# Patient Record
Sex: FEMALE | Race: WHITE | NOT HISPANIC OR LATINO | URBAN - METROPOLITAN AREA
[De-identification: names, ages, dates, MRNs, and addresses within clinical notes are randomized per-mention and may not be internally consistent; named-entity substitution may affect disease eponyms.]

---

## 2024-01-11 ENCOUNTER — LAB REQUISITION (OUTPATIENT)
Dept: LAB | Facility: HOSPITAL | Age: 24
End: 2024-01-11
Payer: COMMERCIAL

## 2024-01-11 DIAGNOSIS — Z34.81 ENCOUNTER FOR SUPERVISION OF OTHER NORMAL PREGNANCY, FIRST TRIMESTER (HHS-HCC): ICD-10-CM

## 2024-01-11 PROCEDURE — 87800 DETECT AGNT MULT DNA DIREC: CPT

## 2024-01-12 ENCOUNTER — LAB (OUTPATIENT)
Dept: LAB | Facility: LAB | Age: 24
End: 2024-01-12
Payer: COMMERCIAL

## 2024-01-12 DIAGNOSIS — Z34.81 ENCOUNTER FOR SUPERVISION OF OTHER NORMAL PREGNANCY, FIRST TRIMESTER (HHS-HCC): Primary | ICD-10-CM

## 2024-01-12 DIAGNOSIS — Z34.81 ENCOUNTER FOR SUPERVISION OF OTHER NORMAL PREGNANCY, FIRST TRIMESTER (HHS-HCC): ICD-10-CM

## 2024-01-12 LAB
ABO GROUP (TYPE) IN BLOOD: NORMAL
ANTIBODY SCREEN: NORMAL
C TRACH RRNA SPEC QL NAA+PROBE: NEGATIVE
ERYTHROCYTE [DISTWIDTH] IN BLOOD BY AUTOMATED COUNT: 12.8 % (ref 11.5–14.5)
EST. AVERAGE GLUCOSE BLD GHB EST-MCNC: 105 MG/DL
HBA1C MFR BLD: 5.3 %
HCT VFR BLD AUTO: 39.2 % (ref 36–46)
HGB BLD-MCNC: 12.5 G/DL (ref 12–16)
HIV 1+2 AB+HIV1 P24 AG SERPL QL IA: NONREACTIVE
MCH RBC QN AUTO: 27.6 PG (ref 26–34)
MCHC RBC AUTO-ENTMCNC: 31.9 G/DL (ref 32–36)
MCV RBC AUTO: 87 FL (ref 80–100)
N GONORRHOEA DNA SPEC QL PROBE+SIG AMP: NEGATIVE
NRBC BLD-RTO: 0 /100 WBCS (ref 0–0)
PLATELET # BLD AUTO: 270 X10*3/UL (ref 150–450)
RBC # BLD AUTO: 4.53 X10*6/UL (ref 4–5.2)
RH FACTOR (ANTIGEN D): NORMAL
WBC # BLD AUTO: 10 X10*3/UL (ref 4.4–11.3)

## 2024-01-12 PROCEDURE — 86317 IMMUNOASSAY INFECTIOUS AGENT: CPT

## 2024-01-12 PROCEDURE — 85027 COMPLETE CBC AUTOMATED: CPT

## 2024-01-12 PROCEDURE — 86780 TREPONEMA PALLIDUM: CPT

## 2024-01-12 PROCEDURE — 36415 COLL VENOUS BLD VENIPUNCTURE: CPT

## 2024-01-12 PROCEDURE — 86803 HEPATITIS C AB TEST: CPT

## 2024-01-12 PROCEDURE — 87340 HEPATITIS B SURFACE AG IA: CPT

## 2024-01-12 PROCEDURE — 87086 URINE CULTURE/COLONY COUNT: CPT

## 2024-01-12 PROCEDURE — 86901 BLOOD TYPING SEROLOGIC RH(D): CPT

## 2024-01-12 PROCEDURE — 86850 RBC ANTIBODY SCREEN: CPT

## 2024-01-12 PROCEDURE — 87389 HIV-1 AG W/HIV-1&-2 AB AG IA: CPT

## 2024-01-12 PROCEDURE — 86900 BLOOD TYPING SEROLOGIC ABO: CPT

## 2024-01-12 PROCEDURE — 83036 HEMOGLOBIN GLYCOSYLATED A1C: CPT

## 2024-01-13 LAB
BACTERIA UR CULT: NORMAL
HBV SURFACE AG SERPL QL IA: NONREACTIVE
HCV AB SER QL: NONREACTIVE
RUBV IGG SERPL IA-ACNC: 3.9 IA
RUBV IGG SERPL QL IA: POSITIVE
T PALLIDUM AB SER QL: NONREACTIVE

## 2024-02-12 ENCOUNTER — LAB (OUTPATIENT)
Dept: LAB | Facility: LAB | Age: 24
End: 2024-02-12
Payer: COMMERCIAL

## 2024-02-12 DIAGNOSIS — Z13.79 ENCOUNTER FOR OTHER SCREENING FOR GENETIC AND CHROMOSOMAL ANOMALIES: Primary | ICD-10-CM

## 2024-02-12 DIAGNOSIS — Z34.81 ENCOUNTER FOR SUPERVISION OF OTHER NORMAL PREGNANCY, FIRST TRIMESTER (HHS-HCC): ICD-10-CM

## 2024-03-14 ENCOUNTER — LAB REQUISITION (OUTPATIENT)
Dept: LAB | Facility: HOSPITAL | Age: 24
End: 2024-03-14
Payer: COMMERCIAL

## 2024-03-14 DIAGNOSIS — F12.90 CANNABIS USE, UNSPECIFIED, UNCOMPLICATED: ICD-10-CM

## 2024-03-14 DIAGNOSIS — Z02.83 ENCOUNTER FOR BLOOD-ALCOHOL AND BLOOD-DRUG TEST: ICD-10-CM

## 2024-03-14 DIAGNOSIS — Z34.82 ENCOUNTER FOR SUPERVISION OF OTHER NORMAL PREGNANCY, SECOND TRIMESTER (HHS-HCC): ICD-10-CM

## 2024-03-14 LAB
AMPHETAMINES UR QL SCN>1000 NG/ML: NEGATIVE
BARBITURATES UR QL SCN>300 NG/ML: NEGATIVE
BENZODIAZ UR QL SCN>300 NG/ML: NEGATIVE
BZE UR QL SCN>300 NG/ML: NEGATIVE
CANNABINOIDS UR QL SCN>50 NG/ML: POSITIVE
FENTANYL+NORFENTANYL UR QL SCN: NEGATIVE
METHADONE UR QL SCN>300 NG/ML: NEGATIVE
OPIATES UR QL SCN>300 NG/ML: NEGATIVE
OXYCODONE UR QL: NEGATIVE
PCP UR QL SCN>25 NG/ML: NEGATIVE

## 2024-03-14 PROCEDURE — 80307 DRUG TEST PRSMV CHEM ANLYZR: CPT

## 2024-03-20 LAB — SCAN RESULT: NORMAL

## 2024-04-11 ENCOUNTER — LAB REQUISITION (OUTPATIENT)
Dept: LAB | Facility: HOSPITAL | Age: 24
End: 2024-04-11
Payer: COMMERCIAL

## 2024-04-11 PROCEDURE — 87086 URINE CULTURE/COLONY COUNT: CPT

## 2024-04-13 LAB — BACTERIA UR CULT: NORMAL

## 2024-05-09 ENCOUNTER — HOSPITAL ENCOUNTER (OUTPATIENT)
Facility: HOSPITAL | Age: 24
Discharge: HOME | End: 2024-05-09
Attending: OBSTETRICS & GYNECOLOGY | Admitting: OBSTETRICS & GYNECOLOGY
Payer: COMMERCIAL

## 2024-05-09 ENCOUNTER — HOSPITAL ENCOUNTER (OUTPATIENT)
Facility: HOSPITAL | Age: 24
End: 2024-05-09
Attending: OBSTETRICS & GYNECOLOGY | Admitting: OBSTETRICS & GYNECOLOGY
Payer: COMMERCIAL

## 2024-05-09 VITALS
HEIGHT: 65 IN | RESPIRATION RATE: 18 BRPM | BODY MASS INDEX: 36.99 KG/M2 | TEMPERATURE: 98.2 F | OXYGEN SATURATION: 97 % | SYSTOLIC BLOOD PRESSURE: 118 MMHG | WEIGHT: 222 LBS | DIASTOLIC BLOOD PRESSURE: 77 MMHG | HEART RATE: 87 BPM

## 2024-05-09 PROCEDURE — 76819 FETAL BIOPHYS PROFIL W/O NST: CPT | Performed by: OBSTETRICS & GYNECOLOGY

## 2024-05-09 PROCEDURE — 99213 OFFICE O/P EST LOW 20 MIN: CPT | Performed by: OBSTETRICS & GYNECOLOGY

## 2024-05-09 PROCEDURE — 99213 OFFICE O/P EST LOW 20 MIN: CPT

## 2024-05-09 RX ORDER — SERTRALINE HYDROCHLORIDE 50 MG/1
50 TABLET, FILM COATED ORAL DAILY
COMMUNITY

## 2024-05-09 ASSESSMENT — PAIN SCALES - GENERAL
PAINLEVEL: 3
PAINLEVEL_OUTOF10: 3

## 2024-05-09 NOTE — DISCHARGE INSTRUCTIONS
Triage discharge instructions:          -assess fetal movements daily        -return to normal activity as tolerated         -may return to work/school when cleared by OB provider    []Notify provider for red or swollen leg that is painful or warm to touch  []Notify provider for fever or chills  []Notify provider for headache that does not get better, even after taking medicine  []Notify provider for blurry vision or spots before your eyes  []Notify provider for signs of depression  Examples include: Persistent sadness, frequent crying, sleep problems, excessive worrying, feeling unable to cope.  []Notify provider for pain, burning or difficulty with emptying your bladder  []Trust your instincts. Always get medical care if you are not feeling well  Or if you have questions or concerns.    []Notify provider for contractions or cramps become more frequent than 3-4 times in an hour  []Notify provider for regular painful contractions every 5 minutes or less for one hour  []Notify provider for leaking of fluid or blood from your vagina  []Notify provider if your baby is not moving as much as usual

## 2024-05-09 NOTE — H&P
Obstetrical Admission History and Physical     25 yo  with EDC=2024.  Patient c/o chest tightness, pinkl spotting when wiping and left sided rib pain.  She was told by her OB to go to ED to evaluate the chest pain.  She denies any heavy bleeding and the spotting was only when wiping, nothing on a pad.     Obstetrical History   OB History    Para Term  AB Living   4         0   SAB IAB Ectopic Multiple Live Births                  # Outcome Date GA Lbr Sourav/2nd Weight Sex Delivery Anes PTL Lv   4 Current            3             2             1                 Past Medical History  Past Medical History:   Diagnosis Date    Other conditions influencing health status     No significant past surgical history    Other conditions influencing health status     No significant past medical history        Past Surgical History   No past surgical history on file.    Social History  Social History     Tobacco Use    Smoking status: Not on file    Smokeless tobacco: Not on file   Substance Use Topics    Alcohol use: Not on file     Substance and Sexual Activity   Drug Use Not on file       Allergies  Patient has no known allergies.     Medications  Medications Prior to Admission   Medication Sig Dispense Refill Last Dose    doxylamine (Unisom, doxylamine,) 25 mg tablet Take 3 tablets (75 mg) by mouth as needed at bedtime for sleep.       sertraline (Zoloft) 50 mg tablet Take 1 tablet (50 mg) by mouth once daily.          Objective    Last Vitals  Temp Pulse Resp BP MAP O2 Sat   36.8 °C (98.2 °F) 87 18 118/77   97 %     Physical Examination  VSS  Lying in bed comfortably  Abd-gravid, NT  Cervix-closed on exam,no blood visualized with speculum exam  BPP: tone 2; movement 2; AFV 2; breathing 2       Normal FHT    Lab Review  Labs in chart were reviewed.      Keep appointment with her OB as scheduled.  To ED to evaluate chest pain

## 2024-07-06 ENCOUNTER — LAB (OUTPATIENT)
Dept: LAB | Facility: LAB | Age: 24
End: 2024-07-06
Payer: COMMERCIAL

## 2024-07-06 DIAGNOSIS — Z34.82 ENCOUNTER FOR SUPERVISION OF OTHER NORMAL PREGNANCY, SECOND TRIMESTER (HHS-HCC): Primary | ICD-10-CM

## 2024-07-06 LAB
ERYTHROCYTE [DISTWIDTH] IN BLOOD BY AUTOMATED COUNT: 14.9 % (ref 11.5–14.5)
GLUCOSE 1H P GLC SERPL-MCNC: 170 MG/DL (ref 65–139)
HCT VFR BLD AUTO: 35.7 % (ref 36–46)
HGB BLD-MCNC: 11.1 G/DL (ref 12–16)
MCH RBC QN AUTO: 28.1 PG (ref 26–34)
MCHC RBC AUTO-ENTMCNC: 31.1 G/DL (ref 32–36)
MCV RBC AUTO: 90 FL (ref 80–100)
NRBC BLD-RTO: 0 /100 WBCS (ref 0–0)
PLATELET # BLD AUTO: 194 X10*3/UL (ref 150–450)
RBC # BLD AUTO: 3.95 X10*6/UL (ref 4–5.2)
WBC # BLD AUTO: 11.5 X10*3/UL (ref 4.4–11.3)

## 2024-07-06 PROCEDURE — 36415 COLL VENOUS BLD VENIPUNCTURE: CPT

## 2024-07-06 PROCEDURE — 82947 ASSAY GLUCOSE BLOOD QUANT: CPT

## 2024-07-06 PROCEDURE — 85027 COMPLETE CBC AUTOMATED: CPT

## 2024-07-19 ENCOUNTER — LAB (OUTPATIENT)
Dept: LAB | Facility: LAB | Age: 24
End: 2024-07-19
Payer: COMMERCIAL

## 2024-07-19 DIAGNOSIS — Z13.79 ENCOUNTER FOR OTHER SCREENING FOR GENETIC AND CHROMOSOMAL ANOMALIES: ICD-10-CM

## 2024-07-19 DIAGNOSIS — L73.2 HIDRADENITIS SUPPURATIVA: Primary | ICD-10-CM

## 2024-07-19 LAB
GLUCOSE 1H P GLC SERPL-MCNC: 134 MG/DL (ref 65–139)
GLUCOSE 2H P GLC SERPL-MCNC: 122 MG/DL (ref 65–139)
GLUCOSE 3H P GLC SERPL-MCNC: 106 MG/DL (ref 65–139)
GLUCOSE P FAST SERPL-MCNC: 84 MG/DL (ref 69–99)

## 2024-07-19 PROCEDURE — 82950 GLUCOSE TEST: CPT

## 2024-07-19 PROCEDURE — 36415 COLL VENOUS BLD VENIPUNCTURE: CPT

## 2024-07-19 PROCEDURE — 82952 GTT-ADDED SAMPLES: CPT

## 2024-07-19 PROCEDURE — 82951 GLUCOSE TOLERANCE TEST (GTT): CPT

## 2024-07-19 PROCEDURE — 82947 ASSAY GLUCOSE BLOOD QUANT: CPT

## 2024-07-30 LAB — SCAN RESULT: NORMAL

## 2024-08-05 ENCOUNTER — HOSPITAL ENCOUNTER (OUTPATIENT)
Facility: HOSPITAL | Age: 24
Discharge: HOME | End: 2024-08-06
Attending: STUDENT IN AN ORGANIZED HEALTH CARE EDUCATION/TRAINING PROGRAM | Admitting: STUDENT IN AN ORGANIZED HEALTH CARE EDUCATION/TRAINING PROGRAM
Payer: COMMERCIAL

## 2024-08-05 VITALS
OXYGEN SATURATION: 97 % | TEMPERATURE: 98.4 F | WEIGHT: 290 LBS | BODY MASS INDEX: 54.75 KG/M2 | DIASTOLIC BLOOD PRESSURE: 61 MMHG | SYSTOLIC BLOOD PRESSURE: 117 MMHG | RESPIRATION RATE: 17 BRPM | HEIGHT: 61 IN | HEART RATE: 93 BPM

## 2024-08-05 ASSESSMENT — PAIN SCALES - GENERAL
PAINLEVEL_OUTOF10: 0 - NO PAIN
PAINLEVEL_OUTOF10: 0 - NO PAIN

## 2024-08-06 PROCEDURE — 99213 OFFICE O/P EST LOW 20 MIN: CPT

## 2024-08-06 RX ORDER — ONDANSETRON HYDROCHLORIDE 2 MG/ML
4 INJECTION, SOLUTION INTRAVENOUS EVERY 6 HOURS PRN
Status: DISCONTINUED | OUTPATIENT
Start: 2024-08-06 | End: 2024-08-06 | Stop reason: HOSPADM

## 2024-08-06 RX ORDER — NIFEDIPINE 10 MG/1
10 CAPSULE ORAL ONCE AS NEEDED
Status: DISCONTINUED | OUTPATIENT
Start: 2024-08-06 | End: 2024-08-06 | Stop reason: HOSPADM

## 2024-08-06 RX ORDER — ONDANSETRON 4 MG/1
4 TABLET, FILM COATED ORAL EVERY 6 HOURS PRN
Status: DISCONTINUED | OUTPATIENT
Start: 2024-08-06 | End: 2024-08-06 | Stop reason: HOSPADM

## 2024-08-06 RX ORDER — HYDRALAZINE HYDROCHLORIDE 20 MG/ML
5 INJECTION INTRAMUSCULAR; INTRAVENOUS ONCE AS NEEDED
Status: DISCONTINUED | OUTPATIENT
Start: 2024-08-06 | End: 2024-08-06 | Stop reason: HOSPADM

## 2024-08-06 RX ORDER — LIDOCAINE HYDROCHLORIDE 10 MG/ML
0.5 INJECTION, SOLUTION EPIDURAL; INFILTRATION; INTRACAUDAL; PERINEURAL ONCE AS NEEDED
Status: DISCONTINUED | OUTPATIENT
Start: 2024-08-06 | End: 2024-08-06 | Stop reason: HOSPADM

## 2024-08-06 RX ORDER — LABETALOL HYDROCHLORIDE 5 MG/ML
20 INJECTION, SOLUTION INTRAVENOUS ONCE AS NEEDED
Status: DISCONTINUED | OUTPATIENT
Start: 2024-08-06 | End: 2024-08-06 | Stop reason: HOSPADM

## 2024-08-06 ASSESSMENT — PAIN SCALES - GENERAL
PAINLEVEL_OUTOF10: 0 - NO PAIN
PAINLEVEL_OUTOF10: 0 - NO PAIN

## 2024-08-14 ENCOUNTER — LAB REQUISITION (OUTPATIENT)
Dept: LAB | Facility: HOSPITAL | Age: 24
End: 2024-08-14
Payer: COMMERCIAL

## 2024-08-14 DIAGNOSIS — Z34.83 ENCOUNTER FOR SUPERVISION OF OTHER NORMAL PREGNANCY, THIRD TRIMESTER (HHS-HCC): ICD-10-CM

## 2024-08-14 PROCEDURE — 87081 CULTURE SCREEN ONLY: CPT

## 2024-08-14 PROCEDURE — 87077 CULTURE AEROBIC IDENTIFY: CPT

## 2024-08-18 LAB — GP B STREP GENITAL QL CULT: NORMAL

## 2024-08-20 ENCOUNTER — ANESTHESIA EVENT (OUTPATIENT)
Dept: OBSTETRICS AND GYNECOLOGY | Facility: HOSPITAL | Age: 24
End: 2024-08-20
Payer: COMMERCIAL

## 2024-08-20 ENCOUNTER — ANESTHESIA (OUTPATIENT)
Dept: OBSTETRICS AND GYNECOLOGY | Facility: HOSPITAL | Age: 24
End: 2024-08-20
Payer: COMMERCIAL

## 2024-08-20 ENCOUNTER — APPOINTMENT (OUTPATIENT)
Dept: OBSTETRICS AND GYNECOLOGY | Facility: HOSPITAL | Age: 24
End: 2024-08-20
Payer: COMMERCIAL

## 2024-08-20 ENCOUNTER — HOSPITAL ENCOUNTER (INPATIENT)
Facility: HOSPITAL | Age: 24
LOS: 3 days | Discharge: HOME | End: 2024-08-23
Attending: STUDENT IN AN ORGANIZED HEALTH CARE EDUCATION/TRAINING PROGRAM | Admitting: SPECIALIST
Payer: COMMERCIAL

## 2024-08-20 DIAGNOSIS — Z34.90 TERM PREGNANCY (HHS-HCC): ICD-10-CM

## 2024-08-20 PROBLEM — R06.09 DYSPNEA ON EXERTION: Status: ACTIVE | Noted: 2024-08-20

## 2024-08-20 PROBLEM — K76.0 FATTY LIVER: Status: ACTIVE | Noted: 2024-08-20

## 2024-08-20 PROBLEM — M06.9 RHEUMATOID ARTHRITIS (MULTI): Status: ACTIVE | Noted: 2024-08-20

## 2024-08-20 LAB
ABO GROUP (TYPE) IN BLOOD: NORMAL
ANTIBODY SCREEN: NORMAL
ERYTHROCYTE [DISTWIDTH] IN BLOOD BY AUTOMATED COUNT: 15.2 % (ref 11.5–14.5)
HCT VFR BLD AUTO: 36.3 % (ref 36–46)
HGB BLD-MCNC: 11.7 G/DL (ref 12–16)
MCH RBC QN AUTO: 27.7 PG (ref 26–34)
MCHC RBC AUTO-ENTMCNC: 32.2 G/DL (ref 32–36)
MCV RBC AUTO: 86 FL (ref 80–100)
NRBC BLD-RTO: 0 /100 WBCS (ref 0–0)
PLATELET # BLD AUTO: 208 X10*3/UL (ref 150–450)
RBC # BLD AUTO: 4.22 X10*6/UL (ref 4–5.2)
RH FACTOR (ANTIGEN D): NORMAL
TREPONEMA PALLIDUM IGG+IGM AB [PRESENCE] IN SERUM OR PLASMA BY IMMUNOASSAY: NONREACTIVE
WBC # BLD AUTO: 11.1 X10*3/UL (ref 4.4–11.3)

## 2024-08-20 PROCEDURE — 3E0P7VZ INTRODUCTION OF HORMONE INTO FEMALE REPRODUCTIVE, VIA NATURAL OR ARTIFICIAL OPENING: ICD-10-PCS | Performed by: SPECIALIST

## 2024-08-20 PROCEDURE — 2500000001 HC RX 250 WO HCPCS SELF ADMINISTERED DRUGS (ALT 637 FOR MEDICARE OP)

## 2024-08-20 PROCEDURE — 2720000007 HC OR 272 NO HCPCS

## 2024-08-20 PROCEDURE — 86780 TREPONEMA PALLIDUM: CPT

## 2024-08-20 PROCEDURE — 86901 BLOOD TYPING SEROLOGIC RH(D): CPT

## 2024-08-20 PROCEDURE — 99223 1ST HOSP IP/OBS HIGH 75: CPT

## 2024-08-20 PROCEDURE — 2500000002 HC RX 250 W HCPCS SELF ADMINISTERED DRUGS (ALT 637 FOR MEDICARE OP, ALT 636 FOR OP/ED)

## 2024-08-20 PROCEDURE — 59050 FETAL MONITOR W/REPORT: CPT

## 2024-08-20 PROCEDURE — 2500000005 HC RX 250 GENERAL PHARMACY W/O HCPCS

## 2024-08-20 PROCEDURE — 7210000002 HC LABOR PER HOUR

## 2024-08-20 PROCEDURE — 85027 COMPLETE CBC AUTOMATED: CPT

## 2024-08-20 PROCEDURE — 1120000001 HC OB PRIVATE ROOM DAILY

## 2024-08-20 PROCEDURE — 36415 COLL VENOUS BLD VENIPUNCTURE: CPT

## 2024-08-20 PROCEDURE — 2500000004 HC RX 250 GENERAL PHARMACY W/ HCPCS (ALT 636 FOR OP/ED)

## 2024-08-20 PROCEDURE — 86923 COMPATIBILITY TEST ELECTRIC: CPT

## 2024-08-20 RX ORDER — OXYTOCIN 10 [USP'U]/ML
10 INJECTION, SOLUTION INTRAMUSCULAR; INTRAVENOUS ONCE AS NEEDED
Status: DISCONTINUED | OUTPATIENT
Start: 2024-08-20 | End: 2024-08-21 | Stop reason: HOSPADM

## 2024-08-20 RX ORDER — CARBOPROST TROMETHAMINE 250 UG/ML
250 INJECTION, SOLUTION INTRAMUSCULAR ONCE AS NEEDED
Status: DISCONTINUED | OUTPATIENT
Start: 2024-08-20 | End: 2024-08-21 | Stop reason: HOSPADM

## 2024-08-20 RX ORDER — SERTRALINE HYDROCHLORIDE 100 MG/1
100 TABLET, FILM COATED ORAL DAILY
Status: DISCONTINUED | OUTPATIENT
Start: 2024-08-20 | End: 2024-08-23 | Stop reason: HOSPADM

## 2024-08-20 RX ORDER — ONDANSETRON 4 MG/1
4 TABLET, FILM COATED ORAL EVERY 6 HOURS PRN
Status: DISCONTINUED | OUTPATIENT
Start: 2024-08-20 | End: 2024-08-21

## 2024-08-20 RX ORDER — OXYTOCIN/0.9 % SODIUM CHLORIDE 30/500 ML
60 PLASTIC BAG, INJECTION (ML) INTRAVENOUS ONCE AS NEEDED
Status: DISCONTINUED | OUTPATIENT
Start: 2024-08-20 | End: 2024-08-21 | Stop reason: HOSPADM

## 2024-08-20 RX ORDER — NIFEDIPINE 10 MG/1
10 CAPSULE ORAL ONCE AS NEEDED
Status: DISCONTINUED | OUTPATIENT
Start: 2024-08-20 | End: 2024-08-21 | Stop reason: HOSPADM

## 2024-08-20 RX ORDER — SODIUM CHLORIDE, SODIUM LACTATE, POTASSIUM CHLORIDE, CALCIUM CHLORIDE 600; 310; 30; 20 MG/100ML; MG/100ML; MG/100ML; MG/100ML
125 INJECTION, SOLUTION INTRAVENOUS CONTINUOUS
Status: DISCONTINUED | OUTPATIENT
Start: 2024-08-20 | End: 2024-08-21

## 2024-08-20 RX ORDER — LOPERAMIDE HYDROCHLORIDE 2 MG/1
4 CAPSULE ORAL EVERY 2 HOUR PRN
Status: DISCONTINUED | OUTPATIENT
Start: 2024-08-20 | End: 2024-08-21 | Stop reason: HOSPADM

## 2024-08-20 RX ORDER — TRANEXAMIC ACID 100 MG/ML
1000 INJECTION, SOLUTION INTRAVENOUS ONCE AS NEEDED
Status: DISCONTINUED | OUTPATIENT
Start: 2024-08-20 | End: 2024-08-21 | Stop reason: HOSPADM

## 2024-08-20 RX ORDER — MISOPROSTOL 200 UG/1
800 TABLET ORAL ONCE AS NEEDED
Status: DISCONTINUED | OUTPATIENT
Start: 2024-08-20 | End: 2024-08-21 | Stop reason: HOSPADM

## 2024-08-20 RX ORDER — METOCLOPRAMIDE 10 MG/1
10 TABLET ORAL EVERY 6 HOURS PRN
Status: DISCONTINUED | OUTPATIENT
Start: 2024-08-20 | End: 2024-08-21

## 2024-08-20 RX ORDER — OXYTOCIN/0.9 % SODIUM CHLORIDE 30/500 ML
2-30 PLASTIC BAG, INJECTION (ML) INTRAVENOUS CONTINUOUS
Status: DISCONTINUED | OUTPATIENT
Start: 2024-08-21 | End: 2024-08-21

## 2024-08-20 RX ORDER — METOCLOPRAMIDE HYDROCHLORIDE 5 MG/ML
10 INJECTION INTRAMUSCULAR; INTRAVENOUS EVERY 6 HOURS PRN
Status: DISCONTINUED | OUTPATIENT
Start: 2024-08-20 | End: 2024-08-21

## 2024-08-20 RX ORDER — LIDOCAINE HYDROCHLORIDE 10 MG/ML
30 INJECTION INFILTRATION; PERINEURAL ONCE AS NEEDED
Status: DISCONTINUED | OUTPATIENT
Start: 2024-08-20 | End: 2024-08-21 | Stop reason: HOSPADM

## 2024-08-20 RX ORDER — LABETALOL HYDROCHLORIDE 5 MG/ML
20 INJECTION, SOLUTION INTRAVENOUS ONCE AS NEEDED
Status: DISCONTINUED | OUTPATIENT
Start: 2024-08-20 | End: 2024-08-21 | Stop reason: HOSPADM

## 2024-08-20 RX ORDER — TERBUTALINE SULFATE 1 MG/ML
0.25 INJECTION SUBCUTANEOUS ONCE AS NEEDED
Status: DISCONTINUED | OUTPATIENT
Start: 2024-08-20 | End: 2024-08-21 | Stop reason: HOSPADM

## 2024-08-20 RX ORDER — METHYLERGONOVINE MALEATE 0.2 MG/ML
0.2 INJECTION INTRAVENOUS ONCE AS NEEDED
Status: DISCONTINUED | OUTPATIENT
Start: 2024-08-20 | End: 2024-08-21 | Stop reason: HOSPADM

## 2024-08-20 RX ORDER — HYDRALAZINE HYDROCHLORIDE 20 MG/ML
5 INJECTION INTRAMUSCULAR; INTRAVENOUS ONCE AS NEEDED
Status: DISCONTINUED | OUTPATIENT
Start: 2024-08-20 | End: 2024-08-21 | Stop reason: HOSPADM

## 2024-08-20 RX ORDER — ONDANSETRON HYDROCHLORIDE 2 MG/ML
4 INJECTION, SOLUTION INTRAVENOUS EVERY 6 HOURS PRN
Status: DISCONTINUED | OUTPATIENT
Start: 2024-08-20 | End: 2024-08-21

## 2024-08-20 SDOH — ECONOMIC STABILITY: TRANSPORTATION INSECURITY
IN THE PAST 12 MONTHS, HAS LACK OF TRANSPORTATION KEPT YOU FROM MEETINGS, WORK, OR FROM GETTING THINGS NEEDED FOR DAILY LIVING?: NO

## 2024-08-20 SDOH — ECONOMIC STABILITY: FOOD INSECURITY: WITHIN THE PAST 12 MONTHS, THE FOOD YOU BOUGHT JUST DIDN'T LAST AND YOU DIDN'T HAVE MONEY TO GET MORE.: SOMETIMES TRUE

## 2024-08-20 SDOH — ECONOMIC STABILITY: TRANSPORTATION INSECURITY
IN THE PAST 12 MONTHS, HAS THE LACK OF TRANSPORTATION KEPT YOU FROM MEDICAL APPOINTMENTS OR FROM GETTING MEDICATIONS?: NO

## 2024-08-20 SDOH — SOCIAL STABILITY: SOCIAL INSECURITY: DO YOU FEEL ANYONE HAS EXPLOITED OR TAKEN ADVANTAGE OF YOU FINANCIALLY OR OF YOUR PERSONAL PROPERTY?: NO

## 2024-08-20 SDOH — ECONOMIC STABILITY: FOOD INSECURITY: WITHIN THE PAST 12 MONTHS, YOU WORRIED THAT YOUR FOOD WOULD RUN OUT BEFORE YOU GOT MONEY TO BUY MORE.: SOMETIMES TRUE

## 2024-08-20 SDOH — SOCIAL STABILITY: SOCIAL INSECURITY: DOES ANYONE TRY TO KEEP YOU FROM HAVING/CONTACTING OTHER FRIENDS OR DOING THINGS OUTSIDE YOUR HOME?: NO

## 2024-08-20 SDOH — SOCIAL STABILITY: SOCIAL INSECURITY: ABUSE SCREEN: ADULT

## 2024-08-20 SDOH — HEALTH STABILITY: MENTAL HEALTH: WERE YOU ABLE TO COMPLETE ALL THE BEHAVIORAL HEALTH SCREENINGS?: NO

## 2024-08-20 SDOH — SOCIAL STABILITY: SOCIAL INSECURITY: HAVE YOU HAD THOUGHTS OF HARMING ANYONE ELSE?: NO

## 2024-08-20 SDOH — SOCIAL STABILITY: SOCIAL INSECURITY: HAVE YOU HAD ANY THOUGHTS OF HARMING ANYONE ELSE?: NO

## 2024-08-20 SDOH — SOCIAL STABILITY: SOCIAL INSECURITY: ARE THERE ANY APPARENT SIGNS OF INJURIES/BEHAVIORS THAT COULD BE RELATED TO ABUSE/NEGLECT?: NO

## 2024-08-20 SDOH — SOCIAL STABILITY: SOCIAL INSECURITY: VERBAL ABUSE: DENIES

## 2024-08-20 SDOH — SOCIAL STABILITY: SOCIAL INSECURITY: HAS ANYONE EVER THREATENED TO HURT YOUR FAMILY OR YOUR PETS?: NO

## 2024-08-20 SDOH — SOCIAL STABILITY: SOCIAL INSECURITY: PHYSICAL ABUSE: DENIES

## 2024-08-20 SDOH — HEALTH STABILITY: MENTAL HEALTH: SUICIDAL BEHAVIOR (LIFETIME): NO

## 2024-08-20 SDOH — ECONOMIC STABILITY: FOOD INSECURITY

## 2024-08-20 SDOH — HEALTH STABILITY: MENTAL HEALTH: WISH TO BE DEAD (PAST 1 MONTH): NO

## 2024-08-20 SDOH — SOCIAL STABILITY: SOCIAL INSECURITY: ARE YOU OR HAVE YOU BEEN THREATENED OR ABUSED PHYSICALLY, EMOTIONALLY, OR SEXUALLY BY ANYONE?: NO

## 2024-08-20 SDOH — ECONOMIC STABILITY: HOUSING INSECURITY: DO YOU FEEL UNSAFE GOING BACK TO THE PLACE WHERE YOU ARE LIVING?: NO

## 2024-08-20 SDOH — ECONOMIC STABILITY: TRANSPORTATION INSECURITY: IN THE PAST 12 MONTHS, HAS LACK OF TRANSPORTATION KEPT YOU FROM MEDICAL APPOINTMENTS OR FROM GETTING MEDICATIONS?: NO

## 2024-08-20 SDOH — ECONOMIC STABILITY: FOOD INSECURITY
WITHIN THE PAST 12 MONTHS, YOU WORRIED THAT YOUR FOOD WOULD RUN OUT BEFORE YOU GOT THE MONEY TO BUY MORE.: SOMETIMES TRUE

## 2024-08-20 SDOH — ECONOMIC STABILITY: TRANSPORTATION INSECURITY

## 2024-08-20 SDOH — HEALTH STABILITY: MENTAL HEALTH: HAVE YOU USED ANY PRESCRIPTION DRUGS OTHER THAN PRESCRIBED IN THE PAST 12 MONTHS?: NO

## 2024-08-20 SDOH — HEALTH STABILITY: MENTAL HEALTH: HAVE YOU USED ANY SUBSTANCES (CANABIS, COCAINE, HEROIN, HALLUCINOGENS, INHALANTS, ETC.) IN THE PAST 12 MONTHS?: NO

## 2024-08-20 SDOH — HEALTH STABILITY: MENTAL HEALTH: NON-SPECIFIC ACTIVE SUICIDAL THOUGHTS (PAST 1 MONTH): NO

## 2024-08-20 ASSESSMENT — ACTIVITIES OF DAILY LIVING (ADL): LACK_OF_TRANSPORTATION: NO

## 2024-08-20 ASSESSMENT — EDINBURGH POSTNATAL DEPRESSION SCALE (EPDS)
THINGS HAVE BEEN GETTING ON TOP OF ME: YES, SOMETIMES I HAVEN'T BEEN COPING AS WELL AS USUAL
I HAVE BEEN SO UNHAPPY THAT I HAVE BEEN CRYING: NO, NEVER
I HAVE BEEN ANXIOUS OR WORRIED FOR NO GOOD REASON: YES, SOMETIMES
I HAVE BEEN SO UNHAPPY THAT I HAVE HAD DIFFICULTY SLEEPING: NOT AT ALL
I HAVE LOOKED FORWARD WITH ENJOYMENT TO THINGS: AS MUCH AS I EVER DID
TOTAL SCORE: 7
I HAVE BEEN ABLE TO LAUGH AND SEE THE FUNNY SIDE OF THINGS: AS MUCH AS I ALWAYS COULD
THE THOUGHT OF HARMING MYSELF HAS OCCURRED TO ME: NEVER
I HAVE BLAMED MYSELF UNNECESSARILY WHEN THINGS WENT WRONG: YES, SOME OF THE TIME
I HAVE FELT SAD OR MISERABLE: NOT VERY OFTEN
I HAVE FELT SCARED OR PANICKY FOR NO GOOD REASON: NO, NOT AT ALL

## 2024-08-20 ASSESSMENT — LIFESTYLE VARIABLES
HOW OFTEN DO YOU HAVE 6 OR MORE DRINKS ON ONE OCCASION: NEVER
SKIP TO QUESTIONS 9-10: 1
HOW MANY STANDARD DRINKS CONTAINING ALCOHOL DO YOU HAVE ON A TYPICAL DAY: PATIENT DOES NOT DRINK
HOW OFTEN DO YOU HAVE A DRINK CONTAINING ALCOHOL: NEVER
AUDIT-C TOTAL SCORE: 0
AUDIT-C TOTAL SCORE: 0

## 2024-08-20 ASSESSMENT — SOCIAL DETERMINANTS OF HEALTH (SDOH): HOW HARD IS IT FOR YOU TO PAY FOR THE VERY BASICS LIKE FOOD, HOUSING, MEDICAL CARE, AND HEATING?: SOMEWHAT HARD

## 2024-08-20 ASSESSMENT — PAIN SCALES - GENERAL
PAINLEVEL_OUTOF10: 0 - NO PAIN
PAINLEVEL_OUTOF10: 0 - NO PAIN

## 2024-08-20 ASSESSMENT — PATIENT HEALTH QUESTIONNAIRE - PHQ9
SUM OF ALL RESPONSES TO PHQ9 QUESTIONS 1 & 2: 1
2. FEELING DOWN, DEPRESSED OR HOPELESS: SEVERAL DAYS
1. LITTLE INTEREST OR PLEASURE IN DOING THINGS: NOT AT ALL

## 2024-08-20 NOTE — PROGRESS NOTES
Intrapartum Progress Note    Assessment/Plan   Jannet Paredes is a 24 y.o.  at 39w0d by LMP c/w 7 wk  who presents for term Induction of Labor.     IOL  Labor Course:  1600 FT/30/-3  1700 CRB, cyto#1  - Epidural at patient request  - Recheck as clinically indicated by maternal or fetal status  - will start pitocin and AROM when appropriate  - delivery: desires vaginal      Fetal Status  -CEFM, cat I  -EFW ~[id number] at 38 weeks per pt, EFW 85%, BPD >99%, MICHELLE 19 on US   -previously counseled on risks of shoulder dystocia and potential for injury to fetus, she is aware of risks and would like to proceed with induction  -1hr 170, 3hr normal  -GBS neg     -Postpartum Contraception Plan: Patient Declined     Pregnancy notable for:   - Obesity - BMI 53  - Anxiety/depression: on Zoloft 100mg daily  - Hx of trauma/PTSD  - PCOS    Seen and discussed w/Dr. Gaby Velásquez MD  PGY-1, Obstetrics and Gynecology          Assessment & Plan  Encounter for induction of labor ([facility])    Pregnancy Problems (from 24 to present)       Problem Noted Resolved    Encounter for induction of labor ([facility]) 2024 by Dorothy Chapa MD No    Priority:  Medium              Subjective   She is doing well. Plans on having an epidural at some point but would like to hold off for now. Having some allergies, not really feeling contractions.     Objective   Last Vitals:  Temp Pulse Resp BP MAP Pulse Ox   36.8 °C (98.2 °F) 96 18 139/83 105 98 %     Vitals Min/Max Last 24 Hours:  Temp  Min: 36.8 °C (98.2 °F)  Max: 36.8 °C (98.2 °F)  Pulse  Min: 96  Max: 96  Resp  Min: 18  Max: 18  BP  Min: 139/83  Max: 139/83  MAP (mmHg)  Min: 105  Max: 105    Intake/Output:  No intake or output data in the 24 hours ending 24    Physical Examination:  Constitutional: No acute distress, alert and cooperative  Head/Neck: Normocephalic,   Respiratory/Thorax: Normal respiratory effort on RA, no increased WOB, inspiratory  "rhonchi noted bilaterally.   Cardiovascular: RRR,   Gastrointestinal: gravid  Musculoskeletal: grossly normal ROM  Extremities: 1+ LE edema  Neurological: alert, oriented, no gross deficit  Psychological: Appropriate mood and behavior  Skin: warm, dry, no lesions    Cervix: not examined  FHR is 130 baseline , with mod variability, + accels, - decels , and a category I tracing.    [place] reading: irregular, ctx q2-8 mins      Lab Review:  Lab Results   Component Value Date    ABO A 08/20/2024    LABRH POS 08/20/2024    ABSCRN NEG 08/20/2024     Lab Results   Component Value Date    WBC 11.1 08/20/2024    HGB 11.7 (L) 08/20/2024    HCT 36.3 08/20/2024     08/20/2024     0   Lab Value Date/Time    GRPBSTREP No Group B Streptococcus (GBS) isolated 08/14/2024 1920     No results found for: \"GLUCOSE\", \"NA\", \"K\", \"CL\", \"CO2\", \"ANIONGAP\", \"BUN\", \"CREATININE\", \"EGFR\", \"CALCIUM\", \"ALBUMIN\", \"PROT\", \"ALKPHOS\", \"ALT\", \"AST\", \"BILITOT\"  "

## 2024-08-20 NOTE — SIGNIFICANT EVENT
LABOR PROGRESS NOTE  SUBJECTIVE  Patient doing well. Okay with CRB and cyto placement now.    OBJECTIVE  Visit Vitals  /83   Pulse 96   Temp 36.8 °C (98.2 °F) (Temporal)   Resp 18        Cervical Exam  Dilation: Fingertip  Effacement (%): 30  Fetal Station: -3  OB Examiner: Eduard MCADAMS  Fetal Assessment  Movement: Present  Mode: External US  Baseline Fetal Heart Rate (bpm): 130 bpm  Baseline Classification: Normal  Variability: Moderate (Between 6 and 25 BPM)  Pattern: Accelerations  Pattern Observations:  (Pt voiding at this time. RN at bedside)  FHR Category: Category I  Multiple Births: No           A&P    IOL  - CRB insertion: Patient was placed in dorsal lithotomy position, a speculum was placed, and a cervical ripening balloon was guided through the external and internal cervical os with a ring forceps. The balloon was inflated with 60cc of normal saline. Patient tolerated the procedure well.  - 5cc of red blood noted in vaginal vault upon speculum insertion, additional 5cc of blood after CRB insertion  - Cyto #1 placed, will place another in 3 hours if balloon still in  - Continue to monitor for cervical change after CRB comes out  - CEFM, Category I    Seen with Dr. Julian Chapa MD

## 2024-08-20 NOTE — H&P
OB Admission H&P    ASSESSMENT & PLAN: Jannet Paredes is a 24 y.o.  at 39w0d who presents for term Induction of Labor.    Plan:   -Admit to L&D, consented  -Labs drawn: T&S, CBC, and Syphilis  -Epidural at patient request  -Recheck as clinically indicated by maternal or fetal status  -Plan to initiate induction with CRB and Cytotec    Fetal Status  -NST reactive, reassuring   -Presentation cephalic based on bedside ultrasound  -EFW ~[id number] at 38 weeks per pt  -- counseled on risks of shoulder dystocia and potential for injury to fetus, she is aware of risks and would like to proceed with induction  -1hr 170, 3hr normal  -GBS neg    -Postpartum Contraception Plan: Patient Declined    Pregnancy notable for:   -Class III obesity (BMI 53)  -Anxiety/depression: Zoloft 100mg daily    Seen and discussed with Dr. Alexia Chapa MD, PGY-1      Subjective   Good fetal movement. Denies vaginal bleeding., Denies contractions., Denies leaking of fluid.      Prenatal Provider: Asif ([facility])    Pregnancy Problems (from 24 to present)       Problem Noted Resolved    Encounter for induction of labor ([facility]) 2024 by Dorothy Chapa MD No    Priority:  Medium              OB History    Para Term  AB Living   4 0 0 0 3 0   SAB IAB Ectopic Multiple Live Births   3 0 0 0 0      # Outcome Date GA Lbr Sourav/2nd Weight Sex Type Anes PTL Lv   4 Current            3 SAB            2 SAB            1 SAB              PMH: None    PSH: None    Social History     Tobacco Use    Smoking status: Not on file    Smokeless tobacco: Not on file   Substance Use Topics    Alcohol use: Not on file       All: None    Medications Prior to Admission   Medication Sig Dispense Refill Last Dose    doxylamine (Unisom, doxylamine,) 25 mg tablet Take 3 tablets (75 mg) by mouth as needed at bedtime for sleep.   Past Week    sertraline (Zoloft) 50 mg tablet Take 2 tablets (100 mg) by mouth once daily.    8/20/2024 at 0100     Objective     Last Vitals  Temp Pulse Resp BP MAP O2 Sat   36.8 °C (98.2 °F) 96 18 139/83 105 98 %       Physical Exam  General: NAD, mood appropriate  Cardiopulmonary: warm and well perfused, breathing comfortably on room air  Abdomen: Gravid, non-tender  Extremities: Symmetric without significant edema  Speculum Exam: deferred  Cervix: Fingertip /30 /-3      Fetal Monitoring  Baseline: 125 bpm, Variability: Moderate, Accelerations: Present and Decelerations: None  Uterine Activity: Irregular contractions  Interpretation: Reactive    Bedside Ultrasound: YES, Findings Cephalic  Scanned with Rubi Goldman CNM    Labs in chart were reviewed.        Prenatal labs reviewed, not remarkable

## 2024-08-21 ENCOUNTER — ANESTHESIA EVENT (OUTPATIENT)
Dept: OBSTETRICS AND GYNECOLOGY | Facility: HOSPITAL | Age: 24
End: 2024-08-21
Payer: COMMERCIAL

## 2024-08-21 ENCOUNTER — ANESTHESIA (OUTPATIENT)
Dept: OBSTETRICS AND GYNECOLOGY | Facility: HOSPITAL | Age: 24
End: 2024-08-21
Payer: COMMERCIAL

## 2024-08-21 PROBLEM — O13.3 GESTATIONAL HYPERTENSION, THIRD TRIMESTER (HHS-HCC): Status: ACTIVE | Noted: 2024-08-21

## 2024-08-21 LAB
ALBUMIN SERPL BCP-MCNC: 3.3 G/DL (ref 3.4–5)
ALP SERPL-CCNC: 106 U/L (ref 33–110)
ALT SERPL W P-5'-P-CCNC: 13 U/L (ref 7–45)
ANION GAP SERPL CALC-SCNC: 14 MMOL/L (ref 10–20)
APTT PPP: 29 SECONDS (ref 27–38)
AST SERPL W P-5'-P-CCNC: 17 U/L (ref 9–39)
BILIRUB SERPL-MCNC: 0.5 MG/DL (ref 0–1.2)
BUN SERPL-MCNC: 7 MG/DL (ref 6–23)
CALCIUM SERPL-MCNC: 8.7 MG/DL (ref 8.6–10.6)
CHLORIDE SERPL-SCNC: 103 MMOL/L (ref 98–107)
CO2 SERPL-SCNC: 20 MMOL/L (ref 21–32)
CREAT SERPL-MCNC: 0.48 MG/DL (ref 0.5–1.05)
EGFRCR SERPLBLD CKD-EPI 2021: >90 ML/MIN/1.73M*2
ERYTHROCYTE [DISTWIDTH] IN BLOOD BY AUTOMATED COUNT: 15.2 % (ref 11.5–14.5)
ERYTHROCYTE [DISTWIDTH] IN BLOOD BY AUTOMATED COUNT: 15.3 % (ref 11.5–14.5)
FIBRINOGEN PPP-MCNC: 649 MG/DL (ref 200–400)
GLUCOSE SERPL-MCNC: 84 MG/DL (ref 74–99)
HCT VFR BLD AUTO: 38 % (ref 36–46)
HCT VFR BLD AUTO: 39 % (ref 36–46)
HGB BLD-MCNC: 12.2 G/DL (ref 12–16)
HGB BLD-MCNC: 12.2 G/DL (ref 12–16)
INR PPP: 0.9 (ref 0.9–1.1)
MCH RBC QN AUTO: 27.9 PG (ref 26–34)
MCH RBC QN AUTO: 28.4 PG (ref 26–34)
MCHC RBC AUTO-ENTMCNC: 31.3 G/DL (ref 32–36)
MCHC RBC AUTO-ENTMCNC: 32.1 G/DL (ref 32–36)
MCV RBC AUTO: 89 FL (ref 80–100)
MCV RBC AUTO: 89 FL (ref 80–100)
NRBC BLD-RTO: 0 /100 WBCS (ref 0–0)
NRBC BLD-RTO: 0 /100 WBCS (ref 0–0)
PLATELET # BLD AUTO: 203 X10*3/UL (ref 150–450)
PLATELET # BLD AUTO: 213 X10*3/UL (ref 150–450)
POTASSIUM SERPL-SCNC: 4.3 MMOL/L (ref 3.5–5.3)
PROT SERPL-MCNC: 6.3 G/DL (ref 6.4–8.2)
PROTHROMBIN TIME: 10.6 SECONDS (ref 9.8–12.8)
RBC # BLD AUTO: 4.29 X10*6/UL (ref 4–5.2)
RBC # BLD AUTO: 4.37 X10*6/UL (ref 4–5.2)
SODIUM SERPL-SCNC: 133 MMOL/L (ref 136–145)
WBC # BLD AUTO: 11.5 X10*3/UL (ref 4.4–11.3)
WBC # BLD AUTO: 12.2 X10*3/UL (ref 4.4–11.3)

## 2024-08-21 PROCEDURE — 59514 CESAREAN DELIVERY ONLY: CPT | Performed by: STUDENT IN AN ORGANIZED HEALTH CARE EDUCATION/TRAINING PROGRAM

## 2024-08-21 PROCEDURE — 7210000002 HC LABOR PER HOUR

## 2024-08-21 PROCEDURE — 2500000001 HC RX 250 WO HCPCS SELF ADMINISTERED DRUGS (ALT 637 FOR MEDICARE OP)

## 2024-08-21 PROCEDURE — 85384 FIBRINOGEN ACTIVITY: CPT

## 2024-08-21 PROCEDURE — 2500000004 HC RX 250 GENERAL PHARMACY W/ HCPCS (ALT 636 FOR OP/ED)

## 2024-08-21 PROCEDURE — 3700000014 HC AN EPIDURAL BLOCK CHARGE: Performed by: STUDENT IN AN ORGANIZED HEALTH CARE EDUCATION/TRAINING PROGRAM

## 2024-08-21 PROCEDURE — 1100000001 HC PRIVATE ROOM DAILY

## 2024-08-21 PROCEDURE — 88307 TISSUE EXAM BY PATHOLOGIST: CPT | Mod: TC,SUR

## 2024-08-21 PROCEDURE — 7100000016 HC LABOR RECOVERY PER HOUR

## 2024-08-21 PROCEDURE — 85610 PROTHROMBIN TIME: CPT

## 2024-08-21 PROCEDURE — 80053 COMPREHEN METABOLIC PANEL: CPT | Performed by: OBSTETRICS & GYNECOLOGY

## 2024-08-21 PROCEDURE — 36415 COLL VENOUS BLD VENIPUNCTURE: CPT | Performed by: OBSTETRICS & GYNECOLOGY

## 2024-08-21 PROCEDURE — 2720000007 HC OR 272 NO HCPCS

## 2024-08-21 PROCEDURE — 2720000007 HC OR 272 NO HCPCS: Performed by: STUDENT IN AN ORGANIZED HEALTH CARE EDUCATION/TRAINING PROGRAM

## 2024-08-21 PROCEDURE — 10907ZC DRAINAGE OF AMNIOTIC FLUID, THERAPEUTIC FROM PRODUCTS OF CONCEPTION, VIA NATURAL OR ARTIFICIAL OPENING: ICD-10-PCS | Performed by: OBSTETRICS & GYNECOLOGY

## 2024-08-21 PROCEDURE — 59514 CESAREAN DELIVERY ONLY: CPT

## 2024-08-21 PROCEDURE — 2500000002 HC RX 250 W HCPCS SELF ADMINISTERED DRUGS (ALT 637 FOR MEDICARE OP, ALT 636 FOR OP/ED)

## 2024-08-21 PROCEDURE — 36415 COLL VENOUS BLD VENIPUNCTURE: CPT

## 2024-08-21 PROCEDURE — 85027 COMPLETE CBC AUTOMATED: CPT

## 2024-08-21 PROCEDURE — 3E033VJ INTRODUCTION OF OTHER HORMONE INTO PERIPHERAL VEIN, PERCUTANEOUS APPROACH: ICD-10-PCS | Performed by: SPECIALIST

## 2024-08-21 PROCEDURE — 7100000016 HC LABOR RECOVERY PER HOUR: Performed by: STUDENT IN AN ORGANIZED HEALTH CARE EDUCATION/TRAINING PROGRAM

## 2024-08-21 PROCEDURE — 85027 COMPLETE CBC AUTOMATED: CPT | Performed by: OBSTETRICS & GYNECOLOGY

## 2024-08-21 RX ORDER — DIPHENHYDRAMINE HYDROCHLORIDE 50 MG/ML
25 INJECTION INTRAMUSCULAR; INTRAVENOUS EVERY 4 HOURS PRN
Status: DISCONTINUED | OUTPATIENT
Start: 2024-08-21 | End: 2024-08-23 | Stop reason: HOSPADM

## 2024-08-21 RX ORDER — ENOXAPARIN SODIUM 100 MG/ML
80 INJECTION SUBCUTANEOUS EVERY 24 HOURS
Status: DISCONTINUED | OUTPATIENT
Start: 2024-08-22 | End: 2024-08-23 | Stop reason: HOSPADM

## 2024-08-21 RX ORDER — POLYETHYLENE GLYCOL 3350 17 G/17G
17 POWDER, FOR SOLUTION ORAL 2 TIMES DAILY PRN
Status: DISCONTINUED | OUTPATIENT
Start: 2024-08-21 | End: 2024-08-23 | Stop reason: HOSPADM

## 2024-08-21 RX ORDER — ACETAMINOPHEN 120 MG/1
SUPPOSITORY RECTAL AS NEEDED
Status: DISCONTINUED | OUTPATIENT
Start: 2024-08-21 | End: 2024-08-21

## 2024-08-21 RX ORDER — NALOXONE HYDROCHLORIDE 0.4 MG/ML
0.1 INJECTION, SOLUTION INTRAMUSCULAR; INTRAVENOUS; SUBCUTANEOUS EVERY 5 MIN PRN
Status: DISCONTINUED | OUTPATIENT
Start: 2024-08-21 | End: 2024-08-23 | Stop reason: HOSPADM

## 2024-08-21 RX ORDER — NIFEDIPINE 10 MG/1
10 CAPSULE ORAL ONCE AS NEEDED
Status: DISCONTINUED | OUTPATIENT
Start: 2024-08-21 | End: 2024-08-23 | Stop reason: HOSPADM

## 2024-08-21 RX ORDER — DIPHENHYDRAMINE HCL 25 MG
25 CAPSULE ORAL EVERY 4 HOURS PRN
Status: DISCONTINUED | OUTPATIENT
Start: 2024-08-21 | End: 2024-08-23 | Stop reason: HOSPADM

## 2024-08-21 RX ORDER — IBUPROFEN 600 MG/1
600 TABLET ORAL EVERY 6 HOURS
Status: DISCONTINUED | OUTPATIENT
Start: 2024-08-22 | End: 2024-08-23 | Stop reason: HOSPADM

## 2024-08-21 RX ORDER — BUPIVACAINE HYDROCHLORIDE 7.5 MG/ML
INJECTION, SOLUTION INTRASPINAL AS NEEDED
Status: DISCONTINUED | OUTPATIENT
Start: 2024-08-21 | End: 2024-08-21

## 2024-08-21 RX ORDER — LIDOCAINE 560 MG/1
1 PATCH PERCUTANEOUS; TOPICAL; TRANSDERMAL
Status: DISCONTINUED | OUTPATIENT
Start: 2024-08-21 | End: 2024-08-23 | Stop reason: HOSPADM

## 2024-08-21 RX ORDER — FAMOTIDINE 10 MG/ML
INJECTION INTRAVENOUS AS NEEDED
Status: DISCONTINUED | OUTPATIENT
Start: 2024-08-21 | End: 2024-08-21

## 2024-08-21 RX ORDER — MISOPROSTOL 200 UG/1
800 TABLET ORAL ONCE AS NEEDED
Status: DISCONTINUED | OUTPATIENT
Start: 2024-08-21 | End: 2024-08-23 | Stop reason: HOSPADM

## 2024-08-21 RX ORDER — ADHESIVE BANDAGE
10 BANDAGE TOPICAL
Status: DISCONTINUED | OUTPATIENT
Start: 2024-08-21 | End: 2024-08-23 | Stop reason: HOSPADM

## 2024-08-21 RX ORDER — SIMETHICONE 80 MG
80 TABLET,CHEWABLE ORAL 4 TIMES DAILY PRN
Status: DISCONTINUED | OUTPATIENT
Start: 2024-08-21 | End: 2024-08-23 | Stop reason: HOSPADM

## 2024-08-21 RX ORDER — ACETAMINOPHEN 325 MG/1
975 TABLET ORAL EVERY 6 HOURS
Status: DISCONTINUED | OUTPATIENT
Start: 2024-08-22 | End: 2024-08-23 | Stop reason: HOSPADM

## 2024-08-21 RX ORDER — OXYCODONE HYDROCHLORIDE 5 MG/1
5 TABLET ORAL EVERY 4 HOURS PRN
Status: DISCONTINUED | OUTPATIENT
Start: 2024-08-22 | End: 2024-08-23 | Stop reason: HOSPADM

## 2024-08-21 RX ORDER — OXYCODONE HYDROCHLORIDE 10 MG/1
10 TABLET ORAL EVERY 4 HOURS PRN
Status: DISCONTINUED | OUTPATIENT
Start: 2024-08-22 | End: 2024-08-23 | Stop reason: HOSPADM

## 2024-08-21 RX ORDER — LOPERAMIDE HYDROCHLORIDE 2 MG/1
4 CAPSULE ORAL EVERY 2 HOUR PRN
Status: DISCONTINUED | OUTPATIENT
Start: 2024-08-21 | End: 2024-08-23 | Stop reason: HOSPADM

## 2024-08-21 RX ORDER — OXYTOCIN 10 [USP'U]/ML
10 INJECTION, SOLUTION INTRAMUSCULAR; INTRAVENOUS ONCE AS NEEDED
Status: DISCONTINUED | OUTPATIENT
Start: 2024-08-21 | End: 2024-08-23 | Stop reason: HOSPADM

## 2024-08-21 RX ORDER — MORPHINE SULFATE 0.5 MG/ML
INJECTION, SOLUTION EPIDURAL; INTRATHECAL; INTRAVENOUS AS NEEDED
Status: DISCONTINUED | OUTPATIENT
Start: 2024-08-21 | End: 2024-08-21

## 2024-08-21 RX ORDER — ONDANSETRON HYDROCHLORIDE 2 MG/ML
4 INJECTION, SOLUTION INTRAVENOUS EVERY 6 HOURS PRN
Status: DISCONTINUED | OUTPATIENT
Start: 2024-08-21 | End: 2024-08-23 | Stop reason: HOSPADM

## 2024-08-21 RX ORDER — HYDROMORPHONE HYDROCHLORIDE 1 MG/ML
0.2 INJECTION, SOLUTION INTRAMUSCULAR; INTRAVENOUS; SUBCUTANEOUS EVERY 5 MIN PRN
Status: DISCONTINUED | OUTPATIENT
Start: 2024-08-21 | End: 2024-08-23 | Stop reason: HOSPADM

## 2024-08-21 RX ORDER — CEFAZOLIN 1 G/1
INJECTION, POWDER, FOR SOLUTION INTRAVENOUS AS NEEDED
Status: DISCONTINUED | OUTPATIENT
Start: 2024-08-21 | End: 2024-08-21

## 2024-08-21 RX ORDER — TRANEXAMIC ACID 100 MG/ML
1000 INJECTION, SOLUTION INTRAVENOUS ONCE AS NEEDED
Status: DISCONTINUED | OUTPATIENT
Start: 2024-08-21 | End: 2024-08-23 | Stop reason: HOSPADM

## 2024-08-21 RX ORDER — BISACODYL 10 MG/1
10 SUPPOSITORY RECTAL DAILY PRN
Status: DISCONTINUED | OUTPATIENT
Start: 2024-08-21 | End: 2024-08-23 | Stop reason: HOSPADM

## 2024-08-21 RX ORDER — PHENYLEPHRINE 10 MG/250 ML(40 MCG/ML)IN 0.9 % SOD.CHLORIDE INTRAVENOUS
CONTINUOUS PRN
Status: DISCONTINUED | OUTPATIENT
Start: 2024-08-21 | End: 2024-08-21

## 2024-08-21 RX ORDER — OXYTOCIN/0.9 % SODIUM CHLORIDE 30/500 ML
60 PLASTIC BAG, INJECTION (ML) INTRAVENOUS ONCE AS NEEDED
Status: DISCONTINUED | OUTPATIENT
Start: 2024-08-21 | End: 2024-08-23 | Stop reason: HOSPADM

## 2024-08-21 RX ORDER — HYDRALAZINE HYDROCHLORIDE 20 MG/ML
5 INJECTION INTRAMUSCULAR; INTRAVENOUS ONCE AS NEEDED
Status: DISCONTINUED | OUTPATIENT
Start: 2024-08-21 | End: 2024-08-23 | Stop reason: HOSPADM

## 2024-08-21 RX ORDER — ONDANSETRON 4 MG/1
4 TABLET, FILM COATED ORAL EVERY 6 HOURS PRN
Status: DISCONTINUED | OUTPATIENT
Start: 2024-08-21 | End: 2024-08-23 | Stop reason: HOSPADM

## 2024-08-21 RX ORDER — CARBOPROST TROMETHAMINE 250 UG/ML
250 INJECTION, SOLUTION INTRAMUSCULAR ONCE AS NEEDED
Status: DISCONTINUED | OUTPATIENT
Start: 2024-08-21 | End: 2024-08-23 | Stop reason: HOSPADM

## 2024-08-21 RX ORDER — LABETALOL HYDROCHLORIDE 5 MG/ML
20 INJECTION, SOLUTION INTRAVENOUS ONCE AS NEEDED
Status: DISCONTINUED | OUTPATIENT
Start: 2024-08-21 | End: 2024-08-23 | Stop reason: HOSPADM

## 2024-08-21 RX ORDER — KETOROLAC TROMETHAMINE 30 MG/ML
INJECTION, SOLUTION INTRAMUSCULAR; INTRAVENOUS AS NEEDED
Status: DISCONTINUED | OUTPATIENT
Start: 2024-08-21 | End: 2024-08-21

## 2024-08-21 RX ORDER — METHYLERGONOVINE MALEATE 0.2 MG/ML
0.2 INJECTION INTRAVENOUS ONCE AS NEEDED
Status: DISCONTINUED | OUTPATIENT
Start: 2024-08-21 | End: 2024-08-23 | Stop reason: HOSPADM

## 2024-08-21 RX ORDER — KETOROLAC TROMETHAMINE 30 MG/ML
30 INJECTION, SOLUTION INTRAMUSCULAR; INTRAVENOUS EVERY 6 HOURS
Status: COMPLETED | OUTPATIENT
Start: 2024-08-22 | End: 2024-08-22

## 2024-08-21 SDOH — HEALTH STABILITY: MENTAL HEALTH: CURRENT SMOKER: 0

## 2024-08-21 ASSESSMENT — PAIN SCALES - GENERAL
PAINLEVEL_OUTOF10: 6
PAINLEVEL_OUTOF10: 0 - NO PAIN
PAINLEVEL_OUTOF10: 0 - NO PAIN
PAIN_LEVEL: 0
PAINLEVEL_OUTOF10: 0 - NO PAIN
PAINLEVEL_OUTOF10: 0 - NO PAIN
PAINLEVEL_OUTOF10: 2
PAINLEVEL_OUTOF10: 0 - NO PAIN

## 2024-08-21 ASSESSMENT — PAIN DESCRIPTION - DESCRIPTORS: DESCRIPTORS: DISCOMFORT;SORE

## 2024-08-21 NOTE — SIGNIFICANT EVENT
"Labor progress note  SUBJECTIVE  Patient doing well with pitocin infusing. Feeling intermittent contractions. Discussed options to AROM now or have epidural first. Patient would like to wait a bit before breaking her water and would like to think on getting an epidural first.      OBJECTIVE  /74   Pulse 82   Temp 36.2 °C (97.2 °F) (Temporal)   Resp 18   Ht 1.575 m (5' 2\")   Wt 137 kg (302 lb 7.5 oz)   LMP 11/21/2023   SpO2 98%   BMI 55.32 kg/m²       Cervical Exam  Dilation: 3  Effacement (%): 40  Fetal Station: -3  Method: Manual  OB Examiner: Didier MCADAMS  Fetal Assessment  Movement: Present  Mode: Wireless Monitoring System  Baseline Fetal Heart Rate (bpm): 145 bpm  Baseline Classification: Normal  Variability: Moderate (Between 6 and 25 BPM)  Pattern: Accelerations, Variable decelerations  Pattern Observations: apparent late decel at 2202, spotty cassie tracing. RN at bedside  FHR Category: Category II  Multiple Births: No      Contraction Frequency: 2-12.5      A&P    IOL  Labor Course:  1600 FT/30/-3  1700 CRB, cyto#1  2015 cyto#2   2330 CRB out  2335 3/40/-3  -  Epidural at patient request  - Recheck as clinically indicated by maternal or fetal status  - will AROM when appropriate  - delivery: desires vaginal   - Continue to monitor for cervical change  - continue pitocin per protocol    Fetal Status  -CEFM, cat II  -EFW ~[id number] at 38 weeks per pt, EFW 85%, BPD >99%, MICHELLE 19 on US 2/12  -1hr 170, 3hr normal  -GBS neg    Pt d/w Dr. Fran Velásquez MD  PGY-1, Obstetrics and Gynecology     "

## 2024-08-21 NOTE — SIGNIFICANT EVENT
Pt now with 3 mild range Bps.  Went to room to recommend labs.  Pt voiced frustration that this is taking so long and said she thinks she is ready for a .  We reviewed normal induction course and that we only just ruptured her membranes but that I am comfortable with doing a  now.  We discussed turning the pitocin off at this time.  Pt then stated she is not ready for a  and was surprised I would turn the pitocin off.    I let her know that we can perform a  at any time. I would want to turn the pit off before.  She will discuss with her partner and let me know.  At this time, will get CBC, CMP for new diagnosis of gHTN  Basil Arndt MD

## 2024-08-21 NOTE — ANESTHESIA POSTPROCEDURE EVALUATION
Patient: Jannet Paredes    Procedure Summary       Date: 24 Room / Location: [facility] OB    Anesthesia Start:  Anesthesia Stop:     Procedure: OBGYN Delivery  Section Diagnosis:     Surgeons: Adeola Greenberg MD Responsible Provider: Nico Rousseau MD    Anesthesia Type: CSE ASA Status: 3            Anesthesia Type: CSE    Vitals Value Taken Time   /70 24   Temp 36.3 24   Pulse 82 24   Resp 16 24   SpO2 97 % 24       Anesthesia Post Evaluation    Patient location during evaluation: bedside  Patient participation: complete - patient participated  Level of consciousness: awake and alert  Pain score: 0  Pain management: satisfactory to patient  Airway patency: patent  Cardiovascular status: stable  Respiratory status: room air  Hydration status: stable  Postoperative Nausea and Vomiting: none        No notable events documented.

## 2024-08-21 NOTE — CARE PLAN
The patient's goals for the shift include  healthy mom, healthy baby    The clinical goals for the shift include reassuring FHR throughout shift      Problem: Vaginal Birth or  Section  Goal: Fetal and maternal status remain reassuring during the birth process  Outcome: Progressing  Goal: Prevention of malpresentation/labor dystocia through delivery  Outcome: Progressing  Goal: Demonstrates labor coping techniques through delivery  Outcome: Progressing  Goal: Minimal s/sx of HDP and BP<160/110  Outcome: Progressing     Problem: Pain - Adult  Goal: Verbalizes/displays adequate comfort level or baseline comfort level  Outcome: Progressing     Problem: Safety - Adult  Goal: Free from fall injury  Outcome: Progressing     Problem: Discharge Planning  Goal: Discharge to home or other facility with appropriate resources  Outcome: Progressing     Problem: Vaginal Birth or  Section  Goal: Tolerate CRB for IOL placement maintenance until dislodgement/removal 12hrs after placement  Outcome: Met

## 2024-08-21 NOTE — PROGRESS NOTES
Antepartum Progress Note    Assessment/Plan   Jannet Paredes is a 24 y.o.  at 39w1d. SUMMER: 2024, by Last Menstrual Period.     IOL  S/p CRB + cyto x 2  Now 3/40/-3  Head well applied.  After counseling, pt agreeable to AROM which was performed for clear fluid  Continue pitocin  Continuous monitoring  Ambulate as desired by patient as long as wireless monnitoring working     Fetal Status  -CEFM, cat I  -EFW ~[id number] at 38 weeks per pt, EFW 85%, BPD >99%, MICHELLE 19 on US   -previously counseled on risks of shoulder dystocia   -1hr 170, 3hr normal  -GBS neg     -Postpartum Contraception Plan: Patient Declined     Pregnancy notable for:   - Obesity - BMI 53  - Anxiety/depression: on Zoloft 100mg daily  - Hx of trauma/PTSD  - PCOS    Assessment & Plan  Encounter for induction of labor ([facility])    Fatty liver    Rheumatoid arthritis (Multi)    Pregnancy Problems (from 24 to present)       Problem Noted Resolved    Encounter for induction of labor ([facility]) 2024 by Dorothy Chapa MD No    Priority:  Medium              Subjective   Pt frustrated by how slow the induction process is going and that she is still only 3cm dilated.  Considering AROM, also asked if this was a situation where she could go home instead.  She is comfortable without epidural, plans epidural but wants to wait as long as possible.     Objective   Allergies:   Latex    Last Vitals:  Temp Pulse Resp BP MAP Pulse Ox   36.2 °C (97.2 °F) 88 18 130/77 99 97 %     Vitals Min/Max Last 24 Hours:  Temp  Min: 36.2 °C (97.2 °F)  Max: 36.8 °C (98.2 °F)  Pulse  Min: 77  Max: 96  Resp  Min: 16  Max: 18  BP  Min: 129/72  Max: 144/85  MAP (mmHg)  Min: 95  Max: 109    Intake/Output:   No intake or output data in the 24 hours ending 24 0810    Physical Exam:  GENERAL: Examination reveals a well developed, well nourished, gravid female in no acute distress. She is alert and cooperative.  FHR is  , with Accelerations, and a Category I  tracing.    CERVIX: 3 cm dilated, 40 % effaced, -3 station; MEMBRANES are Intact  Heaad well applied with membranes bulging, AROM for clear fluid    Lab Data:  Lab Results   Component Value Date    WBC 11.5 (H) 08/21/2024    HGB 12.2 08/21/2024    HCT 38.0 08/21/2024     08/21/2024

## 2024-08-21 NOTE — SIGNIFICANT EVENT
Decision for     To bedside to meet patient. Pit currently off as patient would like to proceed with pCS per maternal request due to duration of labor at this time. Initially undecided but patient feeling okay with decision and would like to proceed. Epidural infusing and pt comfortable. Pt met criteria for gHTN earlier this AM. HELLP labs wnl, BP normotensive. Denies HA, RUQ pain, blurry vision. Patient vocalized understanding of plan to proceed with non-scheduled, non urgent . Is okay with [facility] to include Dr. Arndt, Dr. Glass, and Dr. Chapa. Patient voiced a hx of PTSD and denies any additional health care related triggers at this time.     FHT Cat 1  SVE deferred    Plan of care d/w Dr. Yris Glass MD PGY-4

## 2024-08-21 NOTE — SIGNIFICANT EVENT
"Labor progress note  SUBJECTIVE  Patient doing well with pitocin infusing. Feeling intermittent contractions. Would like to delay AROM for another 15-20 mins.      OBJECTIVE  /72   Pulse 84   Temp 36.2 °C (97.2 °F) (Temporal)   Resp 18   Ht 1.575 m (5' 2\")   Wt 137 kg (302 lb 7.5 oz)   LMP 11/21/2023   SpO2 98%   BMI 55.32 kg/m²       Cervical Exam  Dilation: 3  Effacement (%): 40  Fetal Station: -3  Method: Manual  OB Examiner: Didier MCADAMS  Fetal Assessment  Movement: Present  Mode: Wireless Monitoring System  Baseline Fetal Heart Rate (bpm): 125 bpm  Baseline Classification: Normal  Variability: Moderate (Between 6 and 25 BPM)  Pattern: Accelerations  Pattern Observations: spotty tracing d/t cassie signal. pt up to bathroom at this time  FHR Category: Category I  Multiple Births: No      Contraction Frequency: 2-4      A&P    IOL  Labor Course:  1600 FT/30/-3  1700 CRB, cyto#1  2015 cyto#2   2330 CRB out  2335 3/40/-3  0018 pit started  0416 3/40/-3  - Recheck as clinically indicated by maternal or fetal status  - will AROM when appropriate  - epidural per patient request  - delivery: desires vaginal   - Continue to monitor for cervical change  - continue pitocin per protocol    Vaginal Bleeding   - FHT is cat II  - pt reporting mild contractions, denies abdominal pain outside of contractions  - quarter-sized clot noted in the toilet  - Bleeding was noted post CRB placement and has been minimal since   - abruption labs, CBC wnl (WBC 11.5K), coags nml, fibrinogen 649  - Will continue to monitor     Fetal Status  -CEFM, cat II  -EFW ~[id number] at 38 weeks per pt, EFW 85%, BPD >99%, MICHELLE 19 on US 2/12  -1hr 170, 3hr normal  -GBS neg    Pt seen and d/w Dr. Fran Velásquez MD  PGY-1, Obstetrics and Gynecology     "

## 2024-08-21 NOTE — SIGNIFICANT EVENT
"Labor progress note  SUBJECTIVE  Patient in a lot of pain due to the CRB. Asked to have it deflated and removed.     Upon deflating the balloon, nurse notified that it was already in the vaginal canal and was probably on its way out. Patient requested a cervical exam as well.     OBJECTIVE  /74   Pulse 82   Temp 36.4 °C (97.5 °F) (Temporal)   Resp 18   Ht 1.575 m (5' 2\")   Wt 137 kg (302 lb 7.5 oz)   LMP 11/21/2023   SpO2 96%   BMI 55.32 kg/m²       Cervical Exam  Dilation: 3  Effacement (%): 40  Fetal Station: -3  Method: Manual  OB Examiner: Didier MCADAMS  Fetal Assessment  Movement: Present  Mode: Wireless Monitoring System  Baseline Fetal Heart Rate (bpm): 135 bpm  Baseline Classification: Normal  Variability: Moderate (Between 6 and 25 BPM)  Pattern: Accelerations, Decelerations, unspecified  Pattern Observations: apparent late decel at 2202, spotty cassie tracing. RN at bedside  FHR Category: Category II  Multiple Births: No      Contraction Frequency: 1-5    A&P    IOL  Labor Course:  1600 FT/30/-3  1700 CRB, cyto#1  2015 cyto#2   2330 CRB out  2335 3/40/-3  -  Epidural at patient request  - Recheck as clinically indicated by maternal or fetal status  - will start pitocin and AROM when appropriate  - delivery: desires vaginal   - Continue to monitor for cervical change    Fetal Status  -CEFM, cat I  -EFW ~[id number] at 38 weeks per pt, EFW 85%, BPD >99%, MICHELLE 19 on US 2/12  -1hr 170, 3hr normal  -GBS neg      Pt d/w Dr. Fran Velásquez MD  PGY-1, Obstetrics and Gynecology     "

## 2024-08-21 NOTE — SIGNIFICANT EVENT
"Labor progress note  SUBJECTIVE  Patient doing well with pitocin infusing. Feeling intermittent contractions.     Was able to examine bleeding in toilet, a quarter-sized clot is appreciated floating in the toilet water. Patient denies any pain in between her contractions. Through shared decision-making, patient is worried about the amount of bleeding she's had since CRB placement and discussed abruption labs can be ordered to rule it out.      OBJECTIVE  BP (!) 140/80   Pulse 77   Temp 36.5 °C (97.7 °F) (Temporal)   Resp 18   Ht 1.575 m (5' 2\")   Wt 137 kg (302 lb 7.5 oz)   LMP 11/21/2023   SpO2 97%   BMI 55.32 kg/m²       Cervical Exam  Dilation: 3  Effacement (%): 40  Fetal Station: -3  Method: Manual  OB Examiner: Didier MCADAMS  Fetal Assessment  Movement: Present  Mode: Wireless Monitoring System  Baseline Fetal Heart Rate (bpm): 125 bpm  Baseline Classification: Normal  Variability: Moderate (Between 6 and 25 BPM)  Pattern: Accelerations, variable decels  Pattern Observations: spotty tracing d/t cassie signal. RN at bedside troubleshooting  FHR Category: Category II  Multiple Births: No      Contraction Frequency: 2-2.5 (pt reports moving at 0316, not real ctx at that time. monitor set to low sensitivity, so only one ctx seen from 0320 to 0330)      A&P    IOL  Labor Course:  1600 FT/30/-3  1700 CRB, cyto#1  2015 cyto#2   2330 CRB out  2335 3/40/-3  0018 pit started  0416 3/40/-3  -  Epidural at patient request  - Recheck as clinically indicated by maternal or fetal status  - will AROM when appropriate, head still felt ballotable so will defer AROM for now, advised using peanut ball position to help open the pelvis.   - delivery: desires vaginal   - Continue to monitor for cervical change  - continue pitocin per protocol    Vaginal Bleeding   - FHT is cat II  - pt reporting mild contractions, denies abdominal pain outside of contractions  - quarter-sized clot noted in the toilet  - Bleeding was noted post CRB " placement and has been minimal since   - abruption labs ordered through shared decision-making, will follow-up   - Will continue to monitor     Fetal Status  -CEFM, cat II  -EFW ~[id number] at 38 weeks per pt, EFW 85%, BPD >99%, MICHELLE 19 on US 2/12  -1hr 170, 3hr normal  -GBS neg    Pt d/w Dr. Fran Velásquez MD  PGY-1, Obstetrics and Gynecology

## 2024-08-21 NOTE — SIGNIFICANT EVENT
"Labor progress note  SUBJECTIVE    House officer called to patient bedside for concern over vaginal bleeding. Patient reported dark brown and bright red bleeding with wiping when she went to the bathroom. Patient had flushed the toilet, but upon examination of patient's underwear, there is a 3x1 cm sized blood stain upon the pad, difficult to quantify the amount. Patient reporting intermittent contractions. Discussed the reassuring findings of the fetal heart tracing and patient's vital signs as well as report of feeling only mild contractions. Discussed using our hospitals pads and not flushing the toilet in the future to help quantify the amount of bleeding. Will continue to monitor.      OBJECTIVE  /76   Pulse 89   Temp 36.2 °C (97.2 °F) (Temporal)   Resp 18   Ht 1.575 m (5' 2\")   Wt 137 kg (302 lb 7.5 oz)   LMP 11/21/2023   SpO2 97%   BMI 55.32 kg/m²       Cervical Exam  Dilation: 3  Effacement (%): 40  Fetal Station: -3  Method: Manual  OB Examiner: Didier MCADAMS  Fetal Assessment  Movement: Present  Mode: Wireless Monitoring System  Baseline Fetal Heart Rate (bpm): 130 bpm  Baseline Classification: Normal  Variability: Moderate (Between 6 and 25 BPM)  Pattern: Accelerations, variable decelerations  Pattern Observations: apparent late decel at 2202, spotty cassie tracing. RN at bedside  FHR Category: Category II  Multiple Births: No      Contraction Frequency: 1-4    A&P    IOL  Labor Course:  1600 FT/30/-3  1700 CRB, cyto#1  2015 cyto#2   2330 CRB out  2335 3/40/-3  0018 pit started  -  Epidural at patient request  - Recheck as clinically indicated by maternal or fetal status  - will AROM when appropriate  - delivery: desires vaginal   - Continue to monitor for cervical change  - continue pitocin per protocol     Fetal Status  -CEFM, cat II  -EFW ~[id number] at 38 weeks per pt, EFW 85%, BPD >99%, MICHELLE 19 on US 2/12  -1hr 170, 3hr normal  -GBS neg    Pt d/w Dr. Fran Velásquez MD  PGY-1, Obstetrics and " Gynecology

## 2024-08-21 NOTE — ANESTHESIA PROCEDURE NOTES
CSE Block    Start time: 8/21/2024 5:55 PM  End time: 8/21/2024 6:08 PM  Reason for block: primary anesthetic}  Staffing  Performed: KEM   Authorized by: Nico Rousseau MD    Performed by: KEM Gordillo    Preanesthetic Checklist  Completed: patient identified, IV checked, risks and benefits discussed, surgical consent, monitors and equipment checked, pre-op evaluation, timeout performed and sterile techniques followed  Block Timeout  RN/Licensed healthcare professional reads aloud to the Anesthesia provider and entire team: Patient identity, procedure with side and site, patient position, and as applicable the availability of implants/special equipment/special requirements.  Patient on coagulant treatment: no  Timeout performed at: 8/21/2024 5:55 PM    CSE Block  Patient position: sitting  Prep: ChloraPrep  Sterility prep: mask, cap, drape and gloves  Sedation level: no sedation  Patient monitoring: heart rate, continuous pulse oximetry and blood pressure  Approach: midline  Local numbing: lidocaine 1% to skin and subcutaneous tissues  Vertebral space: lumbar  L3-4  Guidance: landmark technique    Epidural Needle  AMBROSIO technique: saline  Needle type: Tuohy   Needle gauge: 17 G  Needle length: 8.9 cm  Needle insertion depth: 9 cm  Spinal Needle  Needle type: pencil-point   Needle gauge: 25 G  Free flow CSF: yes  Epidural Catheter  Catheter type: multi-orifice  Catheter size: 19 G  Catheter at skin depth: 9 cm  Catheter securement method: clear occlusive dressing and liquid medical adhesive              Assessment  Sensory level: T4 bilateral  Block outcome: Allis test negative  Number of attempts: 1  Procedure assessment: patient tolerated procedure well with no immediate complications

## 2024-08-21 NOTE — L&D DELIVERY NOTE
OB Delivery Note  2024  Jannet Paredes  24 y.o.   , Low Transverse       Gestational Age: 39w1d  /Para:   Quantitative Blood Loss: Admission to Discharge: 954 mL (2024  1:37 PM - 2024 10:09 PM)    Date: 2024  OR Location: [facility] [place]    Name: Jannet Paredes, : 2000, Age: 24 y.o., MRN: [id number], Sex: female    Diagnosis  Pre op:     IUP 39.1wga  gHTN  BMI 55  LGA growth pattern  Anxiety/depression  Hx of trauma/PTSD Post op:       same     Procedures    * OBGYN Delivery  Section    Surgeons      * Adeola Greenberg - Primary    Resident/Fellow/Other Assistant:  Surgeons and Role:  Monica Peters MD PGY-2      Procedure Summary  Anesthesia: Combined Spinal-epidural  ASA: III  Anesthesia Staff: Anesthesiologist: Nico Rousseau MD  C-AA: KEM Gordillo  Estimated Blood Loss: 900mL           Anesthesia Record               Intraprocedure I/O Totals          Intake    Oxytocin Drip 0.00 mL    The total shown is the total volume documented since Anesthesia Start was filed.    Phenylephrine Drip 0.00 mL    The total shown is the total volume documented since Anesthesia Start was filed.    Total Intake 0 mL       Output    Urine 150 mL    Total Output 150 mL       Net    Net Volume -150 mL          Specimen: placenta    Staff:   Circulator:   Scrub Person: Jennifer  Scrub Person: Roz    Indications: prolonged labor course, maternal request    Findings: Normal appearing gravid uterus, fallopian tubes, and ovaries. Amniotic fluid clear, male infant in cephalic presentation    Procedure: Pt was taken to the OR where combined spinal epidural anesthesia was administered. She was then placed in the dorsal supine position with a left lateral tilt. A huitron catheter was placed, SCD's were applied, and a vaginal prep was performed. A pre-procedure time out was performed.  The pt was given prophylactic dose of IV antibiotics.  She was then prepped and draped in  the usual sterile fashion. A Pfannenstiel skin incision was made with the scalpel through the skin and subcutaneous fat to the underlying fascial layer.  The fascia was incised in the midline with the scalpel and the incision was extended bilaterally. The rectus muscle was dissected off bluntly. The muscles were divided in the midline, the peritoneum was then identified and entered bluntly and incision extended superiorly and inferiorly taking care to avoid underlying viscera.  The bladder blade was inserted.       A low transverse uterine incision was made with the scalpel, the uterine cavity was entered, and the hysterotomy was extended bilaterally with cephalocaudal stretch.  The infant was delivered atraumatically, the cord was clamped and cut and infant was handed off to awaiting nursing.  A cord segment and blood sample were collected.  The placenta was then expressed.  The uterus was exteriorized and cleared of all clot and debris. The uterine incision was repaired using a running locked stitch of 0-Vicryl. A second layer of the same suture was placed in an imbricating fashion. An additional interrupted stitch was placed at an area of bleeding along the hysterotomy. Good hemostasis was noted.    The uterus was the placed back inside the pelvis, the gutters were cleared of all clots and debris.  The hysterotomy was again evaluated and found to be hemostatic, the underside of the fascia and bladder and the rectus muscles were also found to be hemostatic.  The fascia was closed using a running stitch of 0-PDS.  The subcutaneous layer was irrigated, small bleeders were cauterized. The skin was closed with 4-0 Monocryl.  All counts were correct, the patient tolerated the procedure well.  Dr. Greenberg was present for the entire procedure.  The patient and infant were taken back to the recovery room in stable condition.    Monica Peters MD  Obstetrics and Gynecology         Raffy Paredes [[id number]]      Labor  Events    Sac identifier: Sac 1  Rupture date/time: 2024 0753  Rupture type: Artificial  Fluid color: Clear  Fluid odor: None  Labor type: Induced Onset of Labor  Labor allowed to proceed with plans for an attempted vaginal birth?: Yes  Induction: Roberts/EASI, Oxytocin, Misoprostol  First cervical ripening date/time: 2024  Induction date/time: 2024 165  Induction indications: Risk Reducing  Complications: Failure to Progress in First Stage       Labor Event Times    Labor onset date/time: 2024 1337       Labor Length    3rd stage: 0h 01m       Placenta    Placenta delivery date/time: 2024 1840  Placenta removal: Expressed  Placenta appearance: Intact  Placenta disposition: pathology       Cord    Vessels: 3 vessels  Complications: None  Cord clamped date/time: 2024 18:40:00  Cord blood disposition: Lab  Gases sent?: No  Stem cell collection (by provider): No       Lacerations    Episiotomy: None  Perineal laceration: None  Other lacerations?: No  Repair suture: None       Anesthesia    Method: Combined spinal-epidural       Operative Delivery    Forceps attempted?: No  Vacuum extractor attempted?: No       Shoulder Dystocia    Shoulder dystocia present?: No       [place] Delivery    Birth date/time: 2024 18:39:00  Delivery type: , Low Transverse   categorization: primary   priority: routine  Indications for : Other (Add Comments)  Incision type: low transverse  Complications: Failure to Progress in First Stage       Resuscitation    Method: Tactile stimulation, Suctioning       Apgars    Living status: Living  Apgar Component Scores:  1 min.:  5 min.:  10 min.:  15 min.:  20 min.:    Skin color:  0  1       Heart rate:  2  2       Reflex irritability:  2  2       Muscle tone:  2  2       Respiratory effort:  2  2       Total:  8  9       Apgars assigned by: ИРИНА ALDRIDGE       Delivery Providers    Delivering clinician: Adeola Greenberg MD    Provider Role    Fabiola Chase, RN Delivery Nurse    Ruba Self, RN Nursery Nurse    oMnica Peters MD Resident    Roz Ochoa, OLY Delivery Nurse                     Monica Peters MD

## 2024-08-21 NOTE — SIGNIFICANT EVENT
"Labor progress note  SUBJECTIVE  Patient doing well. Amenable to second cytotec placement. Having some small amount of bleeding from CRB placement. The bedside nurse will quantify the pad of blood but on exam looks to be appropriate.      OBJECTIVE  BP (!) 144/85   Pulse 92   Temp 36.4 °C (97.5 °F) (Temporal)   Resp 18   Ht 1.575 m (5' 2\")   Wt 137 kg (302 lb 7.5 oz)   LMP 11/21/2023   SpO2 96%   BMI 55.32 kg/m²       Cervical Exam  Dilation: Fingertip  Effacement (%): 30  Fetal Station: -3  OB Examiner: Eduard MCADAMS  Fetal Assessment  Movement: Present  Mode: Wireless Monitoring System  Baseline Fetal Heart Rate (bpm): 130 bpm  Baseline Classification: Normal  Variability: Moderate (Between 6 and 25 BPM)  Pattern: Accelerations  Pattern Observations: apparent late decel, possible cassie malfunction. RN at bedside troubleshooting  FHR Category: Category I  Multiple Births: No      Contraction Frequency: 2.5-8        A&P    IOL  Labor Course:  1600 FT/30/-3  1700 CRB, cyto#1  2015 cyto#2   -  Epidural at patient request  - Recheck as clinically indicated by maternal or fetal status  - will start pitocin and AROM when appropriate  - delivery: desires vaginal     Fetal Status  -CEFM, cat I  -EFW ~[id number] at 38 weeks per pt, EFW 85%, BPD >99%, MICHELLE 19 on US 2/12  -1hr 170, 3hr normal  -GBS neg      Pt d/w Dr. Gaby Velásquez MD  PGY-1, Obstetrics and Gynecology     "

## 2024-08-21 NOTE — ANESTHESIA PREPROCEDURE EVALUATION
Patient: Jannet Paredes    Evaluation Method: In-person visit    Procedure Information    Date: 08/20/24  Procedure: Labor Consult         Relevant Problems   Anesthesia (within normal limits)      Cardiac (within normal limits)      Pulmonary (within normal limits)      Neuro (within normal limits)      GI  hyperemesis      /Renal (within normal limits)      Liver   (+) Fatty liver      Endocrine (within normal limits)      Hematology (within normal limits)      Musculoskeletal   (+) Rheumatoid arthritis (Multi)       Clinical information reviewed:    Allergies  Meds               NPO Detail:  No data recorded     OB/Gyn Evaluation    Present Pregnancy    Patient is pregnant now.   Obstetric History                Physical Exam    Airway  Mallampati: II  TM distance: >3 FB     Cardiovascular   Rhythm: regular  Rate: normal     Dental    Pulmonary    Abdominal          Anesthesia Plan    History of general anesthesia?: no  History of complications of general anesthesia?: no    ASA 3     CSE     The patient is not a current smoker.    Anesthetic plan and risks discussed with patient.  Use of blood products discussed with patient who consented to blood products.    Plan discussed with attending and CAA.

## 2024-08-22 LAB
ERYTHROCYTE [DISTWIDTH] IN BLOOD BY AUTOMATED COUNT: 15 % (ref 11.5–14.5)
HCT VFR BLD AUTO: 28.5 % (ref 36–46)
HGB BLD-MCNC: 9.8 G/DL (ref 12–16)
MCH RBC QN AUTO: 28.7 PG (ref 26–34)
MCHC RBC AUTO-ENTMCNC: 34.4 G/DL (ref 32–36)
MCV RBC AUTO: 83 FL (ref 80–100)
NRBC BLD-RTO: 0 /100 WBCS (ref 0–0)
PLATELET # BLD AUTO: 162 X10*3/UL (ref 150–450)
RBC # BLD AUTO: 3.42 X10*6/UL (ref 4–5.2)
WBC # BLD AUTO: 10.9 X10*3/UL (ref 4.4–11.3)

## 2024-08-22 PROCEDURE — 2500000002 HC RX 250 W HCPCS SELF ADMINISTERED DRUGS (ALT 637 FOR MEDICARE OP, ALT 636 FOR OP/ED)

## 2024-08-22 PROCEDURE — 2500000004 HC RX 250 GENERAL PHARMACY W/ HCPCS (ALT 636 FOR OP/ED)

## 2024-08-22 PROCEDURE — 2500000001 HC RX 250 WO HCPCS SELF ADMINISTERED DRUGS (ALT 637 FOR MEDICARE OP)

## 2024-08-22 PROCEDURE — 1100000001 HC PRIVATE ROOM DAILY

## 2024-08-22 PROCEDURE — 36415 COLL VENOUS BLD VENIPUNCTURE: CPT

## 2024-08-22 PROCEDURE — 85027 COMPLETE CBC AUTOMATED: CPT

## 2024-08-22 ASSESSMENT — PAIN SCALES - GENERAL
PAINLEVEL_OUTOF10: 3
PAINLEVEL_OUTOF10: 0 - NO PAIN
PAINLEVEL_OUTOF10: 1
PAINLEVEL_OUTOF10: 0 - NO PAIN
PAINLEVEL_OUTOF10: 0 - NO PAIN

## 2024-08-22 ASSESSMENT — PAIN DESCRIPTION - LOCATION: LOCATION: INCISION

## 2024-08-22 ASSESSMENT — PAIN DESCRIPTION - DESCRIPTORS: DESCRIPTORS: CRAMPING

## 2024-08-22 ASSESSMENT — PAIN SCALES - PAIN ASSESSMENT IN ADVANCED DEMENTIA (PAINAD): TOTALSCORE: MEDICATION (SEE MAR)

## 2024-08-22 NOTE — DISCHARGE INSTRUCTIONS

## 2024-08-22 NOTE — LACTATION NOTE
Lactation Consultant Note  Lactation Consultation  Reason for Consult: Initial assessment  Consultant Name: Carole Downey, RN, IBCLC    Maternal Information  Has mother  before?: No  Infant to breast within first 2 hours of birth?: Yes  Exclusive Pump and Bottle Feed: No    Maternal Assessment  Breast Assessment: Large, Soft, Warm, Compressible  Nipple Assessment: Intact, Erect with stimulation (expressible on both sides)  Areola Assessment: Normal    Infant Assessment  Infant Behavior: Active alert, Readiness to feed, Feeding cues observed, Rooting response  Infant Assessment: Palate - high/arch/bubble/normal, Good cupping of tongue, Good lateral movement of tongue, Able to elevate tongue to roof of mouth, Tongue protrudes over alveolar ridge    Feeding Assessment  Nutrition Source: Breastmilk, Formula (per mother’s request)  Feeding Method: Nursing at the breast, Paced bottle  Feeding Position: Baby led, One sided nursing, Nose lightly touching breast, Breast sandwich, Football/seated, C - hold, Nipple to nose, Skin to skin, Cross - cradle, Both sides  Suck/Feeding: Baby led rhythmically, Audible swallowing, Coordinated suck/swallow/breathe, Sustained  Latch Assessment: Instructed on deep latch, Latch achieved after repeated attempts, Flanged lips, Eagerly grasped on to latch, Mouth open/moves head side to side, Sucking and swallowing, Chin moves in rhythmic motion, Areolar attachment, Optimal angle of mouth opening, Sucks with long jaw movement, Chin and lower lip contact breast first, Correct tongue position, Minimal assistance is needed, Latch achieved, Comfortable with no pain    LATCH TOOL  Latch: Grasps breast, tongue down, lips flanged, rhythmic sucking  Audible Swallowing: Spontaneous and intermittent (24 hours old)  Type of Nipple: Everted (After stimulation)  Comfort (Breast/Nipple): Soft/non-tender  Hold (Positioning): Minimal assist, teach one side, mother does other, staff holds  LATCH Score:  9    Breast Pump       Other OB Lactation Tools       Patient Follow-up  Inpatient Lactation Follow-up Needed : Yes  Outpatient Lactation Follow-up: Recommended    Other OB Lactation Documentation  Maternal Risk Factors: Hypertension, Other (comment) (anxiety / depression, on zoloft)  Infant Risk Factors: High birth weight >3600 g, Early term birth 37-39 weeks    Recommendations/Summary  LC in room to assist with DBF. Baby is 21 hours old, AGA. Mom is a primary c/s. Mom has been exclusively formula feeding, but would like to exclusively BF. Mom shares with LANI that she doesn't have any milk, her milk is not in yet and that baby hasn't wanted to eat. LC reviewed normal feeding behaviors for a c/s baby with parents. LC also demonstrated how to hand express colostrum and mom is expressible on both sides, mom surprised!  Baby is awake, very eager to eat. Baby is jittery - mom reports she is on zoloft.   Baby was placed in a cross-cradle hold on the left side. Baby eagerly latched on and sustained a latch. After a few minutes baby will get very eager and will pop off. Mom does a great job at getting baby back on and at times baby even tries to get on himself. After a few pop offs on the right baby was moved to a football hold on the right. Baby again, eagerly latched on. Over time baby began getting frustrated and did not seem content with the feed. LANI explained that sometmies when frequent formula supplementation is being used baby's get used to the flow, easiness and volume of the bottle. After many attepts, LANI encouraged parents to bottle feed baby 10mls of formula and then reattempt latch.   LANI plans to go back in and size Mom's nipples so she knows what flange sizes to order. Mom does have a pump for home.   Outpatient lactation info given to mom.  LANI encouraged mom to latch baby every 3 hours, and if needed to top baby off with PACED bottle feeds. LANI encouraged patient's to not give more than 20mls.     LANI returned  to room to assist with latch. Baby a little more calm. Mom was able to latch baby back on by herself but only for a few minutes. Baby got frustrated and began to kick off of mom and mom wanted to stop. Mom wishes to start pumping.  A bedside pump was then set up for mom using 21mm flanges. LC reviewed milk storage, pump part cleaning and sterilizing and pump set up. Mom denied any pain or discomfort with the latch.   LC encouraged mom to latch baby every 3 hours and then supplement if needed.

## 2024-08-22 NOTE — ANESTHESIA POSTPROCEDURE EVALUATION
Patient: Jannet Paredes    Procedure Summary       Date: 24 Room / Location: [facility]    Anesthesia Start:  Anesthesia Stop:     Procedure: OBGYN Delivery  Section Diagnosis:     Surgeons: Adeola Greenberg MD Responsible Provider: Nico Rousseau MD    Anesthesia Type: CSE ASA Status: 3            Anesthesia Type: CSE    Vitals Value Taken Time   /65 24   Temp 36.3 °C (97.3 °F) 24   Pulse 63 24   Resp 18 24   SpO2 99 % 24       Anesthesia Post Evaluation    Patient location during evaluation: floor  Patient participation: complete - patient participated  Level of consciousness: awake  Pain management: adequate  Airway patency: patent  Cardiovascular status: acceptable  Respiratory status: acceptable  Hydration status: acceptable  Postoperative Nausea and Vomiting: none  Comments: Neuraxial site assessed. No visible redness or swelling or drainage. Patient able to ambulate and move all extremities without difficulty. Able to void. No complaints of nausea/vomiting. Tolerating PO intake well. No s/sx of PDPH.          No notable events documented.

## 2024-08-22 NOTE — PROGRESS NOTES
Postpartum Progress Note    Assessment/Plan   Jannet Paredes is a 24 y.o., , who delivered at 39w1d gestation and is now postpartum day 1.    Gestational HTN diagnosed in labor  - HELLP labs neg  - Asx    Pregnancy notable for:  - Obesity - BMI 53  - LGA EFW 3997g, 93% on  (extrapolated ~4247g)  - Anxiety/depression: on Zoloft 100mg daily  - Hx of trauma/PTSD  - PCOS    #Post-op  - continue routine postoperative care  - pain well controlled, transition to PO meds @ 24hr per ERAS protocol  - Hg    -->12.2 ->9.8, acute blood loss anemia, asx.  - dvt risk score    6 , for lovenox ppx    #Maternal Well-Being  - emotional support provided  - bonding with infant    # Feeding  - breastfeeding/pumping encouraged; lactation consult prn     #Dispo  - Anticipate DC PPD#3 pending BP control.  - The signs and symptoms of PEC were reviewed with the patient, including unrelenting headache, vision changes/blurred vision, and RUQ pain  - BP cuff for home for checking BP twice a day  - Pt instructed to call primary OB if SBP > 160 or DBP > 110 or if development of PEC symptoms   - On discharge, follow up with primary OB in:       - 2-5 days for BP check        Assessment & Plan  Encounter for induction of labor ([facility])    Fatty liver    Rheumatoid arthritis (Multi)    [facility]    Pregnancy Problems (from 24 to present)       Problem Noted Resolved    Gestational hypertension, third trimester ([facility]) 2024 by Monica Peters MD No    Priority:  Medium      Encounter for induction of labor ([facility]) 2024 by Dorothy Chapa MD No    Priority:  Medium            Hospital course: gestational hypertension   section delivery  Patient is currently breastfeeding  Patient is not breastfeedingThe patient's blood type is A POS. The baby's blood type is pending. Rhogam is not indicated.    Subjective   Her pain is well controlled with current  medications  She is passing flatus  She is ambulating well  She is tolerating a Adult diet Regular  She reports no breast or nursing problems  She denies emotional concerns today   Her plan for contraception is none     Very pleasant, joking.  Meeting all post op milestones.  BP's discussed, well controlled no meds.  Considering discharge pod#2.    Objective   Allergies:   Latex         Last Vitals:  Temp Pulse Resp BP MAP Pulse Ox   36.2 °C (97.2 °F) 77 20 129/78 95 97 %     Vitals Min/Max Last 24 Hours:  Temp  Min: 35.5 °C (95.9 °F)  Max: 36.6 °C (97.9 °F)  Pulse  Min: 57  Max: 91  Resp  Min: 16  Max: 20  BP  Min: 117/70  Max: 152/76  MAP (mmHg)  Min: 80  Max: 111    Intake/Output:     Intake/Output Summary (Last 24 hours) at 8/22/2024 1356  Last data filed at 8/22/2024 0700  Gross per 24 hour   Intake 2300 ml   Output 2814 ml   Net -514 ml       Physical Exam:  General: Examination reveals a well developed, well nourished, female, in no acute distress. She is alert and cooperative.  Lungs: symmetrical, non-labored breathing.  Cardiac: warm, well-perfused.  Abdomen: soft, non-tender.  Fundus: firm, at umbilicus, and nontender.  Extremities: no redness or tenderness in the calves or thighs.  Neurological: alert, oriented, normal speech, no focal findings or movement disorder noted.     Lab Data:  Lab Results   Component Value Date    WBC 10.9 08/22/2024    HGB 9.8 (L) 08/22/2024    HCT 28.5 (L) 08/22/2024     08/22/2024

## 2024-08-22 NOTE — CARE PLAN
The patient's goals for the shift include  healthy mom, healthy baby     The clinical goals for the shift include no s/sx of PPH during recovery      Problem: Pain - Adult  Goal: Verbalizes/displays adequate comfort level or baseline comfort level  Outcome: Progressing     Problem: Safety - Adult  Goal: Free from fall injury  Outcome: Progressing     Problem: Discharge Planning  Goal: Discharge to home or other facility with appropriate resources  Outcome: Progressing     Problem: Vaginal Birth or  Section  Goal: Fetal and maternal status remain reassuring during the birth process  Outcome: Met  Goal: Prevention of malpresentation/labor dystocia through delivery  Outcome: Met  Goal: Demonstrates labor coping techniques through delivery  Outcome: Met  Goal: Minimal s/sx of HDP and BP<160/110  Outcome: Met  Goal: No s/sx of infection through recovery  Outcome: Met  Goal: No s/sx of hemorrhage through recovery  Outcome: Met     Pt had a  delivery. Both mom and baby are stable. No new orders at this time. Pt and baby transported to [facility].

## 2024-08-22 NOTE — CARE PLAN
Problem: Pain - Adult  Goal: Verbalizes/displays adequate comfort level or baseline comfort level  8/22/2024 0655 by Supa Padgett RN  Outcome: Progressing  8/22/2024 0654 by Supa Padgett RN  Outcome: Progressing     Problem: Postpartum  Goal: Incisions, wounds, or drain sites healing without S/S of infection  Outcome: Progressing     Problem: Postpartum  Goal: No s/sx of hemorrhage  Outcome: Progressing     Problem: Postpartum  Goal: Minimal s/sx of HDP and BP<160/110  Outcome: Progressing

## 2024-08-22 NOTE — SIGNIFICANT EVENT
Patient meets criteria for home monitoring of blood pressure post discharge.  Reason:  current gestational hypertension. Met with patient to assess for availability of home BP monitor.   Patient does not have access to BP monitor at home.  Pt agreed to order home BP monitor from [employer].   X- Large BP monitor delivered to room. Patient educated on how to use BP monitor. Patient educated on importance of continuing to monitor BP at home, recording BP on home monitoring log and s/sx of when to call her provider.  Pt verbalized understanding the above information.

## 2024-08-22 NOTE — PROGRESS NOTES
Social Work Assessment     Patient: Jannet Paredes, 23yo,   Address: [address] [place] [address]  Phone: [phone]    Referral Reason: history of anxiety     Name: Aniket Mckeon, MR# [id number]  [place] : 22    Other Children: none    FOB: Ms Paredes identifies FOB as Mr Mckeon. She states they live together and he is supportive. He was present and appropriate.     Household Composition: Ms Paredes reports she lives with FOB and her mother.    Supports: Ms Paredes reports her mother and FOB are her primary supports.     IPV/DV or Safety Concerns: Ms Paredes denies IPV/safety concerns at this time.     Car-Seat: yes  Safe Sleep Space: yes  Safe Sleep Education: reviewed    Transportation Concerns: none     School/Work/Income: Ms Paredes reports family receives [employer], also applying for food stamps and WIC due to unpaid time off and increased dependents. Ms Paredes denies financial/resources concerns otherwise.     Substance Use History: mj in early pregnancy, denies otherwise    Mental Health Diagnoses/Concerns: Ms Paredes with a history of anxiety and is currently on Zoloft. She reports anxiety managed with this and denies concerns. SW reviewed postpartum depression signs, symptoms, and resources and Ms Paredes indicated understanding.    Bonding: No bonding concerns noted.     Assessment: SW met with Ms Paredes for assessment. She was accepting and engaged. She reports she has needed items for  and good support. She denies current symptoms of depression and anxiety and is connected to services to manage. She also denies financial needs/concerns at this time. No concerns noted.     Plan: Ms Paredes and  clear from Sw perspective.     Signature: SALVATORE Espino

## 2024-08-23 VITALS
RESPIRATION RATE: 16 BRPM | HEIGHT: 62 IN | WEIGHT: 293 LBS | DIASTOLIC BLOOD PRESSURE: 83 MMHG | SYSTOLIC BLOOD PRESSURE: 144 MMHG | OXYGEN SATURATION: 97 % | TEMPERATURE: 98.6 F | HEART RATE: 92 BPM | BODY MASS INDEX: 53.92 KG/M2

## 2024-08-23 PROCEDURE — 2500000001 HC RX 250 WO HCPCS SELF ADMINISTERED DRUGS (ALT 637 FOR MEDICARE OP)

## 2024-08-23 RX ORDER — ACETAMINOPHEN 325 MG/1
975 TABLET ORAL EVERY 6 HOURS
Qty: 360 TABLET | Refills: 0 | Status: SHIPPED | OUTPATIENT
Start: 2024-08-23 | End: 2024-09-22

## 2024-08-23 RX ORDER — IBUPROFEN 600 MG/1
600 TABLET ORAL EVERY 6 HOURS
Qty: 120 TABLET | Refills: 0 | Status: CANCELLED | OUTPATIENT
Start: 2024-08-23 | End: 2024-09-22

## 2024-08-23 RX ORDER — IBUPROFEN 600 MG/1
600 TABLET ORAL EVERY 6 HOURS
Qty: 120 TABLET | Refills: 0 | Status: SHIPPED | OUTPATIENT
Start: 2024-08-23 | End: 2024-09-22

## 2024-08-23 RX ORDER — POLYETHYLENE GLYCOL 3350 17 G/17G
17 POWDER, FOR SOLUTION ORAL DAILY
Qty: 30 PACKET | Refills: 0 | Status: SHIPPED | OUTPATIENT
Start: 2024-08-23 | End: 2024-09-22

## 2024-08-23 RX ORDER — FERROUS SULFATE 325(65) MG
325 TABLET ORAL
Qty: 30 TABLET | Refills: 1 | Status: SHIPPED | OUTPATIENT
Start: 2024-08-23 | End: 2024-10-22

## 2024-08-23 RX ORDER — ACETAMINOPHEN 325 MG/1
975 TABLET ORAL EVERY 6 HOURS
Qty: 360 TABLET | Refills: 0 | Status: CANCELLED | OUTPATIENT
Start: 2024-08-23 | End: 2024-09-22

## 2024-08-23 ASSESSMENT — PAIN SCALES - GENERAL
PAINLEVEL_OUTOF10: 0 - NO PAIN
PAINLEVEL_OUTOF10: 2

## 2024-08-23 ASSESSMENT — PAIN DESCRIPTION - LOCATION: LOCATION: INCISION

## 2024-08-23 NOTE — LACTATION NOTE
Lactation Consultant Note    Recommendations/Summary  Talked with mom about her feeding plan for home. She states that she may continue to breast and formula feed or she may decided to pump and feed some pumped milk and some formula. She is not sure of her plan yet. She says that baby becomes easily frustrated at the breast and is looking for a faster flow. She pumped a couple of times overnight and is producing some drops. Discussed expected pumped milk output over the first several days postpartum. Discussed that once her milk fully comes in over the next few days, baby may be more satisfied at the breast with the faster flow and larger volume of milk. Mom is unsure if she wants to continue to try to latch but feels that she will try to continue to stick with pumping every 2-3 hours. Mom will continue to supplement with formula as well. Mom has a pump for at home. We reviewed the outpatient lactation information.

## 2024-08-23 NOTE — DISCHARGE SUMMARY
Discharge Summary    Admission Date: 2024  Discharge Date: 24  Discharge Diagnosis: Encounter for induction of labor ([facility])     Patient Active Problem List   Diagnosis    Encounter for induction of labor ([facility])    Fatty liver    Rheumatoid arthritis (Multi)    Gestational hypertension, third trimester ([facility])       Hospital Course  Jannet Paredes is a 24 y.o.,         Initially presented for: rrOL and had pCS for failure to progress    Admission Date: 2024    Delivery Date: 2024 6:39 PM    Delivery type: , Low Transverse     GA at delivery: 39w1d    Outcome: Living    Anesthesia during delivery: Combined spinal-epidural    Intrapartum complications: Failure to Progress in First Stage    Feeding method: Breastfeeding Status: Yes    Contraception: declines bridge method and discussed pregnancy spacing of at least one year, abstaining from intercourse for 6wks, and the ability to become pregnant in the absence of regular menses. Pt verbalized understanding    Rhogam: The patient's blood type is A POS. Rhogam is not indicated.     Now postpartum day: 2.    Hospital course n/f:      gHTN  - dx by mild range BP readings >4 hrs apart  - asx  - no meds  - normotensive to mild range in postpartum period  - HELLP labs negative  - To start taking BPs at home and log, BP cuff given  - Reviewed signs of elevated BP, appropriate BP parameters and how to monitor BP at home   - Outpatient follow-up in 3 days for BP check    Acute blood loss anemia  - HMG 12.1 ->  -> POD1 9.8  - recommended POD2 check, pt declines  - asx  - for PO Fe at discharge  Results from last 7 days   Lab Units 24  0622 24  1135 24  0440   HEMOGLOBIN g/dL 9.8* 12.2 12.2      Anxiety, depression  - continue Zoloft 100 daily  - denies emotional concerns or need for adjustment in dosing   - follow up with prescribing provider as needed      PP course otherwise uneventful.  Meeting all  postpartum milestones- ambulating independently, passing flatus, tolerating PO intake, lochia light, voiding spontaneously, and pain well controlled with PO meds.       Dispo  I have reviewed with the patient the standard 3 day stay for hypertensive disorders and the risks/benefits of early discharge, including death, stroke, seizure, and readmission. I strongly recommended that the patient stay for the recommended 3 days. She verbalizes understanding of risks. She verbalizes understanding of symptoms and BPs that warrant immediate medical attention, and desires discharge home today.     - The signs and symptoms of PEC were reviewed with the patient, including unrelenting headache, vision changes/blurred vision, and RUQ pain  - BP cuff for home for checking BP twice a day  - Pt instructed to call primary OB if SBP > 160 or DBP > 110 or if development of PEC symptoms   - Follow up with primary OB in:       - 2-5 days for BP check       - 2 weeks for incision check       - 4-6 weeks for post-partum visit       Pertinent Physical Exam At Time of Discharge  General: well appearing, well nourished, postpartum  Obstetric: fundus firm below umbilicus, lochia light  Abdominal: incision GERMAIN   Skin: Warm, dry; no rashes/lesions/erythema  Neuro: A&Ox3, conversational, no gross motor deficit   GI: no distension, appropriately tender, soft  Respiratory: Even and unlabored on RA  Cardiovascular: Trace BLE edema; No erythema, warmth  Psych: appropriate mood and affect          Your medication list        START taking these medications        Instructions Last Dose Given Next Dose Due   acetaminophen 325 mg tablet  Commonly known as: Tylenol      Take 3 tablets (975 mg) by mouth every 6 hours.       ferrous sulfate (325 mg ferrous sulfate) tablet      Take 1 tablet by mouth once daily with breakfast.       ibuprofen 600 mg tablet      Take 1 tablet (600 mg) by mouth every 6 hours.       polyethylene glycol 17 gram packet  Commonly  known as: Glycolax, Miralax      Take 17 g by mouth once daily.              CONTINUE taking these medications        Instructions Last Dose Given Next Dose Due   sertraline 50 mg tablet  Commonly known as: Zoloft           Unisom (doxylamine) 25 mg tablet  Generic drug: doxylamine                     Where to Get Your Medications        These medications were sent to [id number] - [place], [place] - [address] AT [facility]  [address], [place] [facility] [address]      Phone: [phone]   acetaminophen 325 mg tablet  ferrous sulfate (325 mg ferrous sulfate) tablet  ibuprofen 600 mg tablet  polyethylene glycol 17 gram packet             Outpatient Follow-Up  No future appointments.      I spent 21 minutes in the professional and overall care of this patient      HANNA eRese-CNP

## 2024-08-24 LAB
BLOOD EXPIRATION DATE: NORMAL
DISPENSE STATUS: NORMAL
PRODUCT BLOOD TYPE: 6200
PRODUCT CODE: NORMAL
UNIT ABO: NORMAL
UNIT NUMBER: NORMAL
UNIT RH: NORMAL
UNIT VOLUME: 350
XM INTEP: NORMAL

## 2024-08-28 LAB
LABORATORY COMMENT REPORT: NORMAL
PATH REPORT.FINAL DX SPEC: NORMAL
PATH REPORT.GROSS SPEC: NORMAL
PATH REPORT.RELEVANT HX SPEC: NORMAL
PATH REPORT.TOTAL CANCER: NORMAL

## 2025-06-09 ENCOUNTER — APPOINTMENT (OUTPATIENT)
Dept: PRIMARY CARE | Facility: CLINIC | Age: 25
End: 2025-06-09
Payer: COMMERCIAL

## 2025-08-21 PROBLEM — M54.50 LOW BACK PAIN: Status: ACTIVE | Noted: 2025-08-21

## 2025-08-21 PROBLEM — R10.9 ABDOMINAL PAIN: Status: ACTIVE | Noted: 2025-08-21

## 2025-08-21 PROBLEM — F43.9 UNSPECIFIED TRAUMA- AND STRESSOR-RELATED DISORDER: Status: ACTIVE | Noted: 2022-05-04

## 2025-08-21 PROBLEM — F41.1 GAD (GENERALIZED ANXIETY DISORDER): Status: ACTIVE | Noted: 2022-05-04

## 2025-08-21 PROBLEM — E66.01 MORBID OBESITY (MULTI): Status: ACTIVE | Noted: 2025-08-21

## 2025-08-21 PROBLEM — R10.11 ABDOMINAL PAIN, RUQ (RIGHT UPPER QUADRANT): Status: ACTIVE | Noted: 2025-08-21

## 2025-08-21 PROBLEM — E28.2 POLYCYSTIC OVARY SYNDROME: Status: ACTIVE | Noted: 2025-08-21

## 2025-08-21 PROBLEM — N92.1 MENOMETRORRHAGIA: Status: ACTIVE | Noted: 2025-08-21

## 2025-08-21 PROBLEM — N91.2 AMENORRHEA: Status: ACTIVE | Noted: 2025-08-21

## 2025-08-21 PROBLEM — F41.9 ANXIETY: Status: ACTIVE | Noted: 2025-08-21

## 2025-08-21 PROBLEM — M79.643 PAIN, HAND: Status: ACTIVE | Noted: 2025-08-21

## 2025-08-21 PROBLEM — E78.5 HYPERLIPIDEMIA: Status: ACTIVE | Noted: 2019-10-30

## 2025-08-21 PROBLEM — M25.531 RIGHT WRIST PAIN: Status: ACTIVE | Noted: 2025-08-21

## 2025-08-21 PROBLEM — B99.9 INFECTION: Status: ACTIVE | Noted: 2025-08-21

## 2025-08-21 PROBLEM — L30.9 ECZEMA: Status: ACTIVE | Noted: 2025-08-21

## 2025-08-21 PROBLEM — F33.2 MDD (MAJOR DEPRESSIVE DISORDER), RECURRENT SEVERE, WITHOUT PSYCHOSIS (MULTI): Chronic | Status: ACTIVE | Noted: 2022-05-04

## 2025-08-21 RX ORDER — CLOTRIMAZOLE AND BETAMETHASONE DIPROPIONATE 10; .64 MG/G; MG/G
CREAM TOPICAL
COMMUNITY
Start: 2024-09-12

## 2025-08-21 RX ORDER — PSYLLIUM HUSK 0.4 G
CAPSULE ORAL EVERY 24 HOURS
COMMUNITY
Start: 2022-05-02

## 2025-08-21 RX ORDER — ALPRAZOLAM 0.25 MG/1
0.25 TABLET ORAL EVERY 12 HOURS
COMMUNITY
Start: 2022-05-02

## 2025-08-21 RX ORDER — FAMOTIDINE 40 MG/5ML
40 POWDER, FOR SUSPENSION ORAL
COMMUNITY
Start: 2024-12-07

## 2025-08-21 RX ORDER — PROGESTERONE 200 MG/1
CAPSULE ORAL
COMMUNITY
Start: 2022-05-02

## 2025-08-21 RX ORDER — .BETA.-CAROTENE, ASCORBIC ACID, CHOLECALCIFEROL, .ALPHA.-TOCOPHEROL ACETATE, D-, THIAMINE MONONITRATE, RIBOFLAVIN, PYRIDOXINE HYDROCHLORIDE, FOLIC ACID, CYANOCOBALAMIN, IRON, NIACINAMIDE 2700; 120; 400; 20; 2; 3; 10; 1; 12; 28; 20 [IU]/1; MG/1; [IU]/1; [IU]/1; MG/1; MG/1; MG/1; MG/1; UG/1; MG/1; MG/1
TABLET, CHEWABLE ORAL EVERY 24 HOURS
COMMUNITY

## 2025-08-22 ENCOUNTER — APPOINTMENT (OUTPATIENT)
Dept: SURGERY | Facility: CLINIC | Age: 25
End: 2025-08-22
Payer: COMMERCIAL

## 2025-10-15 ENCOUNTER — APPOINTMENT (OUTPATIENT)
Dept: PODIATRY | Facility: CLINIC | Age: 25
End: 2025-10-15
Payer: COMMERCIAL

## (undated) DEVICE — SUTURE, MONOCRYL PLUS, 4-0, PS-2 UD 27IN

## (undated) DEVICE — DRAPE PACK, CESAREAN SECTION, CUSTOM, UHC

## (undated) DEVICE — SYSTEM, TISSUE RETENTION, W/PADS & STRAPS

## (undated) DEVICE — SUTURE, VICRYL, 0, 36 IN, CT, UNDYED

## (undated) DEVICE — TOWEL PACK, STERILE, 4/PACK, BLUE

## (undated) DEVICE — GLOVE, SURGICAL, PROTEXIS PI BLUE W/NEUTHERA, 6.5, PF, LF

## (undated) DEVICE — SUTURE, MONOCRYL, 2-0, 36 IN, CTX, VIOLET

## (undated) DEVICE — SUTURE, PDS II, 0 36 IN, CT-1, VIOLET

## (undated) DEVICE — GLOVE, SURGICAL, PROTEXIS PI MICRO, 6.5, PF, LF